# Patient Record
Sex: MALE | Race: WHITE
[De-identification: names, ages, dates, MRNs, and addresses within clinical notes are randomized per-mention and may not be internally consistent; named-entity substitution may affect disease eponyms.]

---

## 2018-12-26 LAB
ANION GAP SERPL CALC-SCNC: 14.5 MMOL/L (ref 8–16)
BASOPHILS # BLD AUTO: 0.1 10*3/UL (ref 0–0.1)
BASOPHILS NFR BLD AUTO: 1.4 % (ref 0–1)
BUN SERPL-MCNC: 15 MG/DL (ref 7–26)
BUN/CREAT SERPL: 15 (ref 6–25)
CALCIUM SERPL-MCNC: 9.2 MG/DL (ref 8.4–10.2)
CHLORIDE SERPL-SCNC: 103 MMOL/L (ref 98–107)
CO2 SERPL-SCNC: 27 MMOL/L (ref 22–29)
DEPRECATED NEUTROPHILS # BLD AUTO: 3.5 10*3/UL (ref 2.1–6.9)
EGFRCR SERPLBLD CKD-EPI 2021: > 60 ML/MIN (ref 60–?)
EOSINOPHIL # BLD AUTO: 0 10*3/UL (ref 0–0.4)
EOSINOPHIL NFR BLD AUTO: 0 % (ref 0–6)
ERYTHROCYTE [DISTWIDTH] IN CORD BLOOD: 12.4 % (ref 11.7–14.4)
GLUCOSE SERPLBLD-MCNC: 100 MG/DL (ref 74–118)
HCT VFR BLD AUTO: 44.4 % (ref 38.2–49.6)
HGB BLD-MCNC: 15.4 G/DL (ref 14–18)
LYMPHOCYTES # BLD: 1.5 10*3/UL (ref 1–3.2)
LYMPHOCYTES NFR BLD AUTO: 26.2 % (ref 18–39.1)
MCH RBC QN AUTO: 31 PG (ref 28–32)
MCHC RBC AUTO-ENTMCNC: 34.7 G/DL (ref 31–35)
MCV RBC AUTO: 89.5 FL (ref 81–99)
MONOCYTES # BLD AUTO: 0.6 10*3/UL (ref 0.2–0.8)
MONOCYTES NFR BLD AUTO: 11.1 % (ref 4.4–11.3)
NEUTS SEG NFR BLD AUTO: 60.9 % (ref 38.7–80)
PLATELET # BLD AUTO: 175 X10E3/UL (ref 140–360)
POTASSIUM SERPL-SCNC: 4.5 MMOL/L (ref 3.5–5.1)
RBC # BLD AUTO: 4.96 X10E6/UL (ref 4.3–5.7)
SODIUM SERPL-SCNC: 140 MMOL/L (ref 136–145)

## 2018-12-26 NOTE — DIAGNOSTIC IMAGING REPORT
EXAM: CHEST 2 VIEWS, PA and lateral

DATE: 12/26/2018 9:10 AM  Time stamp on exam: 9:24 AM

INDICATION: Preoperative

COMPARISON: None



FINDINGS:

LINES/TUBES: None



LUNGS: No consolidations or edema. 



PLEURA: No effusions or pneumothorax.



HEART AND MEDIASTINUM: Normal size and contour.



BONES AND SOFT TISSUES: No acute findings. Cervical spine fixation plate

partially visualized.



IMPRESSION:

No acute thoracic abnormality.











Signed by: Dr. Doe Whitlock DO on 12/26/2018 9:54 AM

## 2018-12-28 ENCOUNTER — HOSPITAL ENCOUNTER (OUTPATIENT)
Dept: HOSPITAL 88 - OR | Age: 58
Discharge: HOME | End: 2018-12-28
Attending: PODIATRIST
Payer: COMMERCIAL

## 2018-12-28 VITALS — SYSTOLIC BLOOD PRESSURE: 118 MMHG | DIASTOLIC BLOOD PRESSURE: 79 MMHG

## 2018-12-28 DIAGNOSIS — D21.21: ICD-10-CM

## 2018-12-28 DIAGNOSIS — G57.61: Primary | ICD-10-CM

## 2018-12-28 DIAGNOSIS — I44.0: ICD-10-CM

## 2018-12-28 DIAGNOSIS — Z01.810: ICD-10-CM

## 2018-12-28 DIAGNOSIS — Z88.2: ICD-10-CM

## 2018-12-28 DIAGNOSIS — Z01.812: ICD-10-CM

## 2018-12-28 DIAGNOSIS — Z01.818: ICD-10-CM

## 2018-12-28 PROCEDURE — 88304 TISSUE EXAM BY PATHOLOGIST: CPT

## 2018-12-28 PROCEDURE — 71046 X-RAY EXAM CHEST 2 VIEWS: CPT

## 2018-12-28 PROCEDURE — 93005 ELECTROCARDIOGRAM TRACING: CPT

## 2018-12-28 PROCEDURE — 80048 BASIC METABOLIC PNL TOTAL CA: CPT

## 2018-12-28 PROCEDURE — 28080 REMOVAL OF FOOT LESION: CPT

## 2018-12-28 PROCEDURE — 28043 EXC FOOT/TOE TUM SC < 1.5 CM: CPT

## 2018-12-28 PROCEDURE — 36415 COLL VENOUS BLD VENIPUNCTURE: CPT

## 2018-12-28 PROCEDURE — 85025 COMPLETE CBC W/AUTO DIFF WBC: CPT

## 2018-12-28 NOTE — XMS REPORT
Summary of Care: 9/25/15 - 9/25/15

                             Created on: 2108



EH NOBLES

External Reference #: [id number]

: 1960

Sex: Male



Demographics







                          Address                   [address] [place]  [address]-

 

                          Home Phone                [phone]

 

                          Preferred Language        English

 

                          Marital Status            

 

                          Catholic Affiliation     None

 

                          Race                      White/

 

                          Ethnic Group              Non-





Author







                          Author                    [facility] [place]

 

                          Organization              [facility] [place]

 

                          Address                   Unknown

 

                          Phone                     Unavailable







Encounter





HQ Encntr_alias(FIN) [id number] Date(s): 9/25/15 - 9/25/15

[facility] [place] [address] [place] [address] 
[phone]

Discharge Disposition: Home

Attending Physician: Jarad Wells MD





Vital Signs





No data available for this section



Problem List







    



              Condition     Effective Dates     Status       Health Status     Informant

 

    



                           Chest                     Active  



                                         pain(Confirmed)    

 

    



                           Lyme                      Resolved  



                                         disease(Confirmed)    

 

    



                           Syncope(Confirmed)        Active  







Allergies, Adverse Reactions, Alerts







   



                 Substance       Reaction        Severity        Status

 

   



                           morphine                  Active

 

   



                           NKFA                      Active

 

   



                           sulfa drugs               Active







Medications





No data available for this section



Results





No data available for this section



Immunizations





No data available for this section



Procedures







   



                 Procedure       Date            Related Diagnosis     Body Site

 

   



                           Procedure1                  

 

   



                           Repair of meniscus2         







1Neck Surgery C5 C7 fused



2left knee



Social History







 



                           Social History Type       Response

 

 



                           Exercise                  1

 

 



                           Alcohol                   Never, Previous treatment: None.

 

 



                           Smoking Status            Never smoker; Exposure to Tobacco Smoke None; Cigarette Smoking

 Last 365



                                         Days No; Reg Smoking Cessation Counseling No







1none



Assessment and Plan





No data available for this section

## 2018-12-28 NOTE — XMS REPORT
Summary of Care: 17 - 17

                             Created on: 2063



EH NOBLES

External Reference #: [id number]

: 1960

Sex: Male



Demographics







                          Address                   [address] [place]  [address]-

 

                          Home Phone                [phone]

 

                          Preferred Language        English

 

                          Marital Status            

 

                          Congregation Affiliation     None

 

                          Race                      White

 

                          Additional Race(s)        White/



 

                          Ethnic Group              Unknown





Author







                          Author                    [facility]

 

                          Organization              [facility]

 

                          Address                   Unknown

 

                          Phone                     Unavailable







Encounter





MUNDO Foreman(KIMBERLYN) [id number] Date(s): 17 - 17

[facility] [address] [place] [address]-     [phone]


Discharge Disposition: Home or Self Care

Attending Physician: Luzmaria Ryan MD

Admitting Physician: Luzmaria Ryan MD





Vital Signs







                    1                   2                   3



                                         Most recent to   



                                         oldest [Reference   



                                         Range]:   

 

                                        187.96 cm 

                          (17 10:15 PM)        187.96 cm 

                          (17 7:54 PM)          



                                         Height   

 

                                        97.9 DegF 

                          (17 12:38 PM)        97 DegF 

                          (17 7:02 AM)         97.4 DegF 

                                        (17 4:30 AM)



                                         Temperature Oral   



                                         [96.4-99.1 DegF]   

 

                                        117/68 mmHg 

                          (17 12:38 PM)        106/67 mmHg 

                          (17 7:02 AM)         112/69 mmHg 

                                        (17 4:30 AM)



                                         Blood Pressure   



                                         [/60-90 mmHg]   

 

                                        18 BRMIN 

                          (17 12:38 PM)        18 BRMIN 

                          (17 7:02 AM)         18 BRMIN 

                                        (17 4:30 AM)



                                         Respiratory Rate   



                                         [14-20 BRMIN]   

 

                                        53 bpm 

*LOW*

                          (17 12:38 PM)        52 bpm 

*LOW*

                          (17 7:02 AM)         52 bpm 

*LOW*

                                        (17 4:30 AM)



                                         Peripheral Pulse   



                                         Rate [ bpm]   

 

                                        81.818 kg 

                          (17 10:15 PM)        81.8 kg 

                          (17 7:54 PM)         81.818 kg 

                                        (17 3:25 PM)



                                         Weight   

 

                                        23.16 m2 

                          (17 10:15 PM)        23.15 m2 

                          (17 7:54 PM)          



                                         Body Mass Index   







Problem List







    



              Condition     Effective Dates     Status       Health Status     Informant

 

    



                           Chest                     Active  



                                         pain(Confirmed)    

 

    



                           Lyme                      Resolved  



                                         disease(Confirmed)    

 

    



                           Syncope(Confirmed)        Active  







Allergies, Adverse Reactions, Alerts







   



                 Substance       Reaction        Severity        Status

 

   



                           sulfa drugs               Active

 

   



                           morphine                  Active







Medications





acetaminophen

650 mg, 2 tab, Route: PO, Drug form: TAB, Q4H, Dosing Weight 81.818, kg, PRN Tamera
n Score 7-10, Start date: 17 17:52:00 CST, Duration: 30 day, Stop date:  17:51:00 CST

Notes: Do not exceed 4 gm/day.  (Same as: Tylenol)

Start Date: 17

Stop Date: 17

Status: Discontinued



acetaminophen-codeine #3

2 tab, Route: PO, Drug Form: TAB, Dosing Weight 81.818, kg, Q4H, PRN Pain Score 
7-10, Start date: 17 17:52:00 CST, Duration: 30 day, Stop date: 18 1
7:51:00 CST

Notes: Do not exceed 4gm/day of acetaminophen.  (Same as: Tylenol with Codeine #
3)

Start Date: 17

Stop Date: 17

Status: Discontinued



cyclobenzaprine

10 mg, 1 tab, Route: PO, Drug form: TAB, TID, Dosing Weight 81.818, kg, PRN Spas
m, Start date: 17 20:46:00 CST, Duration: 30 day, Stop date: 18 20:4
5:00 CST

Notes: (Same As: Flexeril)

Start Date: 17

Stop Date: 17

Status: Discontinued



cyclobenzaprine 10 mg oral tablet

10 mg=1 tab, PO, TID, PRN for spasms, # 30 tab, 0 Refill(s)

Start Date: 17

Stop Date: 17

Status: Ordered



Ibu 600 mg oral tablet

600 mg=1 tab, PO, QID, # 120 tab, 0 Refill(s)

Start Date: 17

Status: Ordered



ibuprofen

600 mg, 1 tab, Route: PO, Drug form: TAB, QID, Dosing Weight 81.818, kg, Start d
ate: 17 21:00:00 CST, Duration: 30 day, Stop date: 18 17:00:00 CST

Notes: (Same as: Motrin)"Do Not Crush"  Take with food.

Start Date: 17

Stop Date: 17

Status: Discontinued



Lactated Ringers (Bolus) IV

1,000 mL, 1,000 ml/hr, Infuse Over: 1 hr, Route: IV, 1,000, Drug form: INJ, ONCE
, Priority: STAT, Dosing Weight 81.818 kg, Start date: 17 15:53:00 CST, St
op date: 17 15:53:00 CST

Start Date: 17

Stop Date: 17

Status: Completed



nitroglycerin SL Tab

0.4 mg, 1 tab, Route: SL, Drug form: TAB, Q5Min, Dosing Weight 81.818, kg, Start
date: 17 15:55:00 CST, Duration: 3 doses or times, Stop date: 17 16
:05:00 CST

Notes: (Same as:Nitroquick, Nitrostat)"Do Not Crush"  Sublingual tablet

Start Date: 17

Stop Date: 17

Status: Completed



ondansetron

4 mg, 2 mL, Route: IVP, Drug form: INJ, Q8H, Dosing Weight 81.818, kg, PRN Nause
a & Vomiting, Start date: 17 17:52:00 CST, Duration: 30 day, Stop date: 
18 17:51:00 CST

Notes: (Same as: Zofran)  *** MEDICATION WASTE ***Product Size:  4 mgProduct Was
louie:  ___ mg

Start Date: 17

Stop Date: 17

Status: Discontinued



Saline Flush 0.9%

10 mL, Route: IVP, Drug Form: INJ, Dosing Weight 81.818, kg, PRN, PRN Line Flush
, Start date: 17 15:23:00 CST, Duration: 30 day, Stop date: 18 15:22
:00 CST

Notes: (Same as: BD Posiflush)

Start Date: 17

Stop Date: 17

Status: Discontinued



Sodium Chloride 0.9% IV 2,000 mL

2,000 mL, Rate: 100 ml/hr, Infuse over: 20 hr, Route: IV, Dosing Weight 81.818 k
g, Total Volume: 2,000, Start date: 17 17:52:00 CST, Duration: 10 hr, Stop
date: 17 3:51:00 CST, 2.05, m2

Start Date: 17

Stop Date: 17

Status: Completed



Visipaque 320 mg/mL injectable solution

100 mL, Route: IVP, Drug Form: SOLN, Dosing Weight 81.818, kg, ONCALL, For CTA e
xam with GFR < 31 mL/min, STAT, Start date: 17 20:38:00 CST, Duration: 1 
doses or times

Notes: (Same as: Visipaque).WASTE: F/P - Black; E - Municipal Trash Bin

Start Date: 17

Stop Date: 17

Status: Completed



Results





ELECTROLYTES





                    1                   2                   3



                                         Most recent to   



                                         oldest [Reference   



                                         Range]:   

 

                                        142 mEq/L 

                          (17 5:49 AM)         141 mEq/L 

                          (17 5:49 AM)         140 mEq/L 

                                        (17 3:37 PM) 



                                         Sodium Lvl [135-145   



                                         mEq/L]   

 

                                        3.9 mEq/L 

                          (17 5:49 AM)         4.0 mEq/L 

                          (17 5:49 AM)         3.8 mEq/L 

                                        (17 3:37 PM) 



                                         Potassium Lvl   



                                         [3.5-5.1 mEq/L]   

 

                                        109 mEq/L 

                          (17 5:49 AM)         108 mEq/L 

                          (17 5:49 AM)         103 mEq/L 

                                        (17 3:37 PM) 



                                         Chloride Lvl [   



                                         mEq/L]   

 

                                        26 mEq/L 

                          (17 5:49 AM)         26 mEq/L 

                          (17 5:49 AM)         27 mEq/L 

                                        (17 3:37 PM) 



                                         CO2 [24-32 mEq/L]   

 

                                        10.9 mEq/L 

                          (17 5:49 AM)         11.0 mEq/L 

                          (17 5:49 AM)         13.8 mEq/L 

                                        (17 3:37 PM) 



                                         AGAP [10.0-20.0   



                                         mEq/L]   







CHEM PANEL





                    1                   2                   3



                                         Most recent to   



                                         oldest [Reference   



                                         Range]:   

 

                                        0.80 mg/dL 

                          (17 5:49 AM)         0.80 mg/dL 

                          (17 5:49 AM)         1.20 mg/dL 

                                        (17 3:37 PM) 



                                         Creatinine Lvl   



                                         [0.50-1.40 mg/dL]   

 

                                        98 mL/min/1.73m2 1

*NA*

                          (17 5:49 AM)         98 mL/min/1.73m2 2

*NA*

                          (17 5:49 AM)         66 mL/min/1.73m2 3

*NA*

                                        (17 3:37 PM) 



                                         eGFR   

 

                                        11 mg/dL 

                          (17 5:49 AM)         11 mg/dL 

                          (17 5:49 AM)         10 mg/dL 

                                        (17 3:37 PM) 



                                         BUN [7-22 mg/dL]   

 

                                        14 

                          (17 5:49 AM)         8 

                          (17 3:37 PM)          



                                         B/C Ratio [6-25]   

 

                                        88 mg/dL 

                          (17 5:49 AM)         89 mg/dL 

                          (17 5:49 AM)         91 mg/dL 

                                        (17 3:37 PM) 



                                         Glucose Lvl [70-99   



                                         mg/dL]   

 

                                        5.9 g/dL 

*LOW*

                          (17 5:49 AM)         6.0 g/dL 

*LOW*

                          (17 5:49 AM)         7.5 g/dL 

                                        (17 3:37 PM) 



                                         Total Protein   



                                         [6.4-8.4 g/dL]   

 

                                        3.2 g/dL 

*LOW*

                          (17 5:49 AM)         3.2 g/dL 

*LOW*

                          (17 5:49 AM)         4.4 g/dL 

                                        (17 3:37 PM) 



                                         Albumin Lvl [3.5-5.0   



                                         g/dL]   

 

                                        2.7 g/dL 

                          (17 5:49 AM)         2.8 g/dL 

                          (17 5:49 AM)         3.1 g/dL 

                                        (17 3:37 PM) 



                                         Globulin [2.7-4.2   



                                         g/dL]   

 

                                        1.2 

                          (17 5:49 AM)         1.1 

                          (17 5:49 AM)         1.4 

                                        (17 3:37 PM) 



                                         A/G Ratio [0.7-1.6]   

 

                                        7.9 mg/dL 

*LOW*

                          (17 5:49 AM)         7.9 mg/dL 

*LOW*

                          (17 5:49 AM)         8.7 mg/dL 

                                        (17 3:37 PM) 



                                         Calcium Lvl   



                                         [8.5-10.5 mg/dL]   

 

                                        70 unit/L 

*HI*

                          (17 5:49 AM)         70 unit/L 

*HI*

                          (17 5:49 AM)         98 unit/L 

*HI*

                                        (17 3:37 PM) 



                                         ALT [0-65 unit/L]   

 

                                        37 unit/L 

                          (17 5:49 AM)         34 unit/L 

                          (17 5:49 AM)         55 unit/L 

*HI*

                                        (17 3:37 PM) 



                                         AST [0-37 unit/L]   

 

                                        65 unit/L 

                          (17 5:49 AM)         66 unit/L 

                          (17 5:49 AM)         92 unit/L 

                                        (17 3:37 PM) 



                                         Alk Phos [   



                                         unit/L]   

 

                                        2.1 mg/dL 

*HI*

                          (17 5:49 AM)         2.1 mg/dL 

*HI*

                          (17 5:49 AM)         1.9 mg/dL 

*HI*

                                        (17 3:37 PM) 



                                         Bili Total [0.2-1.3   



                                         mg/dL]   

 

                                        0.3 mg/dL 

                    (17 5:49 AM)                          



                                         Bili Direct [0.0-0.3   



                                         mg/dL]   

 

                                        1.8 mg/dL 

*HI*

                    (17 5:49 AM)                          



                                         Bili Indirect   



                                         [0.0-1.0 mg/dL]   

 

                                        <0.05 ng/mL 

                    (17 5:49 AM)                          



                                         Procalcitonin Lvl   



                                         [0.00-0.10 ng/mL]   







1Result Comment: The eGFR is calculated using the CKD-EPI formula. In most 
young, healthy individuals the eGFR will be >90 mL/min/1.73m2. The eGFR declines
with age. An eGFR of 60-89 may be normal in some populations, particularly the 
elderly, for whom the CKD-EPI formula has not been extensively validated. Use of
the eGFR is not recommended in the following populations:



Individuals with unstable creatinine concentrations, including pregnant patients
and those with serious co-morbid conditions.



Patients with extremes in muscle mass or diet. 



The data above are obtained from the National Kidney Disease Education Program (
NKDEP) which additionally recommends that when the eGFR is used in patients with
extremes of body mass index for purposes of drug dosing, the eGFR should be mul
tiplied by the estimated BMI.



2Result Comment: The eGFR is calculated using the CKD-EPI formula. In most 
young, healthy individuals the eGFR will be >90 mL/min/1.73m2. The eGFR declines
with age. An eGFR of 60-89 may be normal in some populations, particularly the 
elderly, for whom the CKD-EPI formula has not been extensively validated. Use of
the eGFR is not recommended in the following populations:



Individuals with unstable creatinine concentrations, including pregnant patients
and those with serious co-morbid conditions.



Patients with extremes in muscle mass or diet. 



The data above are obtained from the National Kid[employer] Disease Education Program (
NKDEP) which additionally recommends that when the eGFR is used in patients with
extremes of body mass index for purposes of drug dosing, the eGFR should be mul
tiplied by the estimated BMI.



3Result Comment: The eGFR is calculated using the CKD-EPI formula. In most 
young, healthy individuals the eGFR will be >90 mL/min/1.73m2. The eGFR declines
with age. An eGFR of 60-89 may be normal in some populations, particularly the 
elderly, for whom the CKD-EPI formula has not been extensively validated. Use of
the eGFR is not recommended in the following populations:



Individuals with unstable creatinine concentrations, including pregnant patients
and those with serious co-morbid conditions.



Patients with extremes in muscle mass or diet. 



The data above are obtained from the National Kidney Disease Education Program (
NKDEP) which additionally recommends that when the eGFR is used in patients with
extremes of body mass index for purposes of drug dosing, the eGFR should be mul
tiplied by the estimated BMI.



CARDIAC ENZYMES





                    1                   2                   3



                                         Most recent to   



                                         oldest [Reference   



                                         Range]:   

 

                                        101 unit/L 

                    (17 3:37 PM)                          



                                         Total CK [   



                                         unit/L]   

 

                                        0.7 ng/mL 

                    (17 3:37 PM)                          



                                         CK MB [0.5-3.6   



                                         ng/mL]   

 

                                        0.7 

                    (17 3:37 PM)                          



                                         CK MB Index   



                                         [0.0-2.5]   

 

                                        <0.02 ng/mL 

                    (17 3:37 PM)                          



                                         Troponin-I   



                                         [0.00-0.40 ng/mL]   

 

                                        34 pg/mL 

                    (17 5:49 AM)                          



                                         BNP [<=100 pg/mL]   







IMMUNOLOGY





                    1                   2                   3



                                         Most recent to   



                                         oldest [Reference   



                                         Range]:   

 

                                        Negative 

*NA*

                    (17 11:47 PM)                          



                                         Hep Bs Ag [Negative]   

 

                                        Negative 

*NA*

                    (17 11:47 PM)                          



                                         Hep B Core IgM   



                                         [Negative]   

 

                                        Negative 

*NA*

                    (17 11:47 PM)                          



                                         Hep A IgM [Negative]   

 

                                        Negative 

*NA*

                    (17 11:47 PM)                          



                                         Hep C Ab   







HEMATOLOGY





                    1                   2                   3



                                         Most recent to   



                                         oldest [Reference   



                                         Range]:   

 

                                        5.3 K/CMM 

                          (17 5:49 AM)         7.3 K/CMM 

                          (17 3:37 PM)          



                                         WBC [3.7-10.4 K/CMM]   

 

                                        4.53 M/CMM 

*LOW*

                          (17 5:49 AM)         4.99 M/CMM 

                          (17 3:37 PM)          



                                         RBC [4.70-6.10   



                                         M/CMM]   

 

                                        13.6 g/dL 

*LOW*

                          (17 5:49 AM)         15.6 g/dL 

                          (17 3:37 PM)          



                                         Hgb [14.0-18.0 g/dL]   

 

                                        39.4 % 

*LOW*

                          (17 5:49 AM)         44.9 % 

                          (17 3:37 PM)          



                                         Hct [42.0-54.0 %]   

 

                                        87.0 fL 

                          (17 5:49 AM)         89.9 fL 

                          (17 3:37 PM)          



                                         MCV [80.0-94.0 fL]   

 

                                        30.0 pg 

                          (17 5:49 AM)         31.2 pg 

*HI*

                          (17 3:37 PM)          



                                         MCH [27.0-31.0 pg]   

 

                                        34.5 g/dL 

                          (17 5:49 AM)         34.8 g/dL 

                          (17 3:37 PM)          



                                         MCHC [32.0-36.0   



                                         g/dL]   

 

                                        13.2 % 

                          (17 5:49 AM)         13.0 % 

                          (17 3:37 PM)          



                                         RDW [11.5-14.5 %]   

 

                                        152 K/CMM 

                          (17 5:49 AM)         214 K/CMM 

                          (17 3:37 PM)          



                                         Platelet [133-450   



                                         K/CMM]   

 

                                        7.9 fL 

                          (17 5:49 AM)         7.6 fL 

                          (17 3:37 PM)          



                                         MPV [7.4-10.4 fL]   

 

                                        54.3 % 

                          (17 5:49 AM)         59.2 % 

                          (17 3:37 PM)          



                                         Segs [45.0-75.0 %]   

 

                                        30.2 % 

                          (17 5:49 AM)         26.1 % 

                          (17 3:37 PM)          



                                         Lymphocytes   



                                         [20.0-40.0 %]   

 

                                        10.2 % 

                          (17 5:49 AM)         11.6 % 

                          (17 3:37 PM)          



                                         Monocytes [2.0-12.0   



                                         %]   

 

                                        4.0 % 

                          (17 5:49 AM)         2.2 % 

                          (17 3:37 PM)          



                                         Eosinophils [0.0-4.0   



                                         %]   

 

                                        1.3 % 

*HI*

                          (17 5:49 AM)         0.9 % 

                          (17 3:37 PM)          



                                         Basophils [0.0-1.0   



                                         %]   

 

                                        2.9 K/CMM 

                          (17 5:49 AM)         4.3 K/CMM 

                          (17 3:37 PM)          



                                         Segs-Bands #   



                                         [1.5-8.1 K/CMM]   

 

                                        1.6 K/CMM 

                          (17 5:49 AM)         1.9 K/CMM 

                          (17 3:37 PM)          



                                         Lymphocytes #   



                                         [1.0-5.5 K/CMM]   

 

                                        0.5 K/CMM 

                          (17 5:49 AM)         0.8 K/CMM 

                          (17 3:37 PM)          



                                         Monocytes # [0.0-0.8   



                                         K/CMM]   

 

                                        0.2 K/CMM 

                          (17 5:49 AM)         0.2 K/CMM 

                          (17 3:37 PM)          



                                         Eosinophils #   



                                         [0.0-0.5 K/CMM]   

 

                                        0.1 K/CMM 

                          (17 5:49 AM)         0.1 K/CMM 

                          (17 3:37 PM)          



                                         Basophils # [0.0-0.2   



                                         K/CMM]   







Immunizations





No data available for this section



Procedures







   



                 Procedure       Date            Related Diagnosis     Body Site

 

   



                           Procedure1                  

 

   



                           Repair of meniscus2         

 

   



                                         Cervical spinal fusion   







1Neck Surgery C5 C7 fused



2left knee



Social History







 



                           Social History Type       Response

 

 



                           Exercise                  1

 

 



                           Alcohol                   Never, Previous treatment: None.

 

 



                           Smoking Status            Never smoker; Exposure to Tobacco Smoke None; Cigarette Smoking

 Last 365



                                         Days No; Reg Smoking Cessation Counseling No







1none



Assessment and Plan

Extracted from:





  



                     Title: Discharge Summary     Author: Luzmaria Ryan     Date: 17



                                         MD 









                                         Impression and Plan

Atypical chest pain not due to coronary artery disease

History of Lyme's disease since 

Shortness of breath which is most likely noncardiac related.



FOLLOWUP:

The patient will follow up with his PCP.



DISCHARGE MEDICATIONS:

See  medication reconciliation sheet for details of discharge medications.



DISCHARGE DIET:

Cardiac diet.





Extracted from:





  



                     Title: Cardiology     Author: Jackie Tinoco NP     Date: 17











Patient:   EH NOBLES            MRN: [id number]            FIN: 
[id number]

Age:   57 years     Sex:  Male     :  1960

Associated Diagnoses:   None

Author:   Jackie Tinoco NP



Chief Complaint



                                        2017 22:15

chest pain

                                        2017 20:01

Chest pains and shortness of breath

                                        2017 15:25

pt from PCP having severe chest pain for a "couple days" per Dr. Hutchinson pt may 
need heart cath





Subjective

Cardiology NP with Dr. Ott



Patient seen and examined

No acute changes or distress

Insertion site looks good

[facility]se called with reports of NS-Bradycardia, short lived, asymptomatic



Review of Systems

Constitutional:  Negative.

Respiratory:  Shortness of breath.

Cardiovascular:  Negative.

Gastrointestinal:  Negative.

Genitourinary:  Negative.

Musculoskeletal:  Negative.

Integumentary:  Negative.

Neurologic:  Negative.



Health Status

Allergies:

Allergic Reactions (All)

Severity Not Documented

Morphine- No reactions were documented.

Sulfa drugs- No reactions were documented.

Canceled/Inactive Reactions (All)

Severity Not Documented

NKFA- No reactions were documented.,

Allergies (2) ActiveReaction

morphineNone Documented

sulfa drugsNone Documented



Current medications:

No qualifying data available

,

Medications (6) Active

Scheduled: (1)

ibuprofen 600 mg TAB  600 mg 1 tab, PO, QID

Continuous: (0)

PRN: (5)

acetaminophen 325 mg TABLET  650 mg 2 tab, PO, Q4H

acetaminophen-codeine 300-30 mg TAB  2 tab, PO, Q4H

cyclobenzaprine 10 mg TAB  10 mg 1 tab, PO, TID

ondansetron 4 mg/2ml INJ VL  4 mg 2 mL, IVP, Q8H

sodium chloride 0.9% 10 ml flush syr BD  10 mL, IVP, PRN



Problem list:

All Problems

Chest pain / SNOMED CT 63186843 / Confirmed

Syncope / SNOMED CT 831678151 / Confirmed,

Active Problems (2)

Chest pain 

Syncope 





Objective

VS/Measurements

Measurements from flowsheet : Measurements

                                        2017 22:15

Heparin Dosing Weight (kg)

                                        81.82

                                        2017 22:15

Height

                                        187.96 cm



Height Collection Method

Stated



Weight

                                        81.818 kg



Dosing Weight Difference Percent

                                        0.022 %



Dosing Weight Collection Method

Measured



Body Surface Area

                                        2.0668 m2



Body Mass Index

                                        23.16 m2

                                        2017 21:30

Heparin Dosing Weight (kg)

                                        81.80

                                        2017 19:54

Height

                                        187.96 cm



Height Collection Method

Stated



Weight

                                        81.8 kg



Dosing Weight Difference Percent

                                        -0.022 %



Dosing Weight Collection Method

Estimated



Body Surface Area

                                        2.0666 m2



Body Mass Index

                                        23.15 m2

                                        2017 15:28

Heparin Dosing Weight (kg)

                                        81.82

                                        2017 15:25

Weight

                                        81.818 kg



Dosing Weight Difference Percent

                                        0 %



Dosing Weight Collection Method

Measured



,



Vital Signs (last 24 hrs) 

Last Charted 

Temp Oral 

                                        97 DegF  (DEC 08 07:)

Heart Rate Peripheral 

L52 bpm  (DEC 08 07:)

Resp Rate 

                                        18 BRMIN  (DEC 08 07:)

SBP 

                                        106 mmHg  (DEC 08 07:)

DBP 

                                        67 mmHg  (DEC 08 07:)

SpO2 

                                        97 %  (DEC 08 00:04)

Weight 

                                        81.818 kg  (DEC 07 22:15)

Height 

                                        187.96 cm  (DEC 07 22:15)

BMI 

                                        23.16  (DEC 07 22:15)



Labs 

Most Recent Results 

Previous Results 

WBC 

                                        5.3  (DEC 08)

                                        7.3  (DEC 07)

                                        -- 

                                        -- 

Hgb 

L 13.6  (DEC 08)

                                        15.6  (DEC 07)

                                        -- 

                                        -- 

Hct 

L 39.4  (DEC 08)

                                        44.9  (DEC 07)

                                        -- 

                                        -- 

Plt 

                                        152  (DEC 08)

                                        214  (DEC 07)

                                        -- 

                                        -- 

Na 

                                        142  (DEC 08)

                                        141  (DEC 08)

                                        140  (DEC 07)

                                        -- 

K 

                                        4.0  (DEC 08)

                                        3.9  (DEC 08)

                                        3.8  (DEC 07)

                                        -- 

CO2 

                                        26  (DEC 08)

                                        26  (DEC 08)

                                        27  (DEC 07)

                                        -- 

Cl 

                                        108  (DEC 08)

                                        109  (DEC 08)

                                        103  (DEC 07)

                                        -- 

Cr 

                                        0.80  (DEC 08)

                                        0.80  (DEC 08)

                                        1.20  (DEC 07)

                                        -- 

BUN 

                                        11  (DEC 08)

                                        11  (DEC 08)

                                        10  (DEC 07)

                                        -- 

Glucose Random 

                                        88  (DEC 08)

                                        89  (DEC 08)

                                        91  (DEC 07)

                                        -- 

Ca 

L 7.9  (DEC 08)

L 7.9  (DEC 08)

                                        8.7  (DEC 07)

                                        -- 

Troponin 

<0.02  (DEC 07)

                                        -- 

CK MB 

                                        0.7  (DEC 07)

                                        -- 

Total CK 

                                        101  (DEC 07)

[facility], -- 



Impression and Plan

                                        1. Shortness of breath

                                        - CE neg

                                        - No acute ECG changes

                                        - No significant coronary disease by coronary angiogram, mild dialted Ao root

                                        - EF 50-55%

                                        - Elevated PA pressures by RHC

                                        - Pulmonary consulted

                                        - Hx of Lyme disease



                                        2. Bradycardia

                                        - Stable

                                        - Asymptomaitc

                                        - On no chronotropic inhibiting medicaitons

                                        - Monitor



                                        3. Pulmonary HTN

                                        - Pulmonary consulted



Discussed in rounds with Dr. Ott









 



                           Addendum                  As above. Pt needs a full pulmonary evaluation - likely outpt



                                         by 



                                         Maged Woods MD 



                                         on 



                                         2017 



                                         23:03 





Extracted from:





  



                     Title: General Admission H&P *     Author: Luzmaria Ryan     Date: 17





                                         MD 









                                         Impression and Plan

Atypical chest pain not due to coronary artery disease

History of Lyme's disease since 

Shortness of breath which is most likely noncardiac related.



Plan: Patient is admitted for further evaluation of this chest pain and 
shortness of breath.  Cardiologist has consulted infectious disease specialist 
for evaluation of shortness of breath.  Cardiologist has also consulted 
infectious disease specialist to evaluate patient for Lyme disease.  I will get 
BNP and also get blood gases.  I am waiting for pulmonology and infectious 
disease consult.  Patient is currently under observation status.

## 2018-12-28 NOTE — XMS REPORT
Summary of Care: 14 - 14

                             Created on: 2076



EH NOBLES

External Reference #: [id number]

: 1960

Sex: Male



Demographics







                          Address                   [address] [place]  [address]

 

                          Home Phone                [phone]

 

                          Preferred Language        English

 

                          Marital Status            

 

                          [employer] Affiliation     None

 

                          Race                      White/

 

                          Ethnic Group              Non-





Author







                          Organization              Unknown

 

                          Address                   Unknown

 

                          Phone                     Unavailable







Encounter





MUNDO Foreman(KIMBERLYN) [id number] Date(s): 14 - 14

[address]

Discharge Disposition: Home

Physician Attending: Kong Ewing MD

Physician_Referring: Kong Ewing MD





Reason for Visit





SYNCOPE



Vital Signs







 



                           Most recent to            1



                                         oldest [Reference 



                                         Range]: 

 

 



                           Height                    187.96 cm



                                         (14 1:45 PM)

 

 



                           Weight                    85 kg



                                         (14 1:45 PM)

 

 



                           Body Mass Index           24.06 m2



                                         (14 1:45 PM)







Problem List







    



              Condition     Effective Dates     Status       Health Status     Informant

 

    



                           Chest                     Active  



                                         pain(Confirmed)    

 

    



                           Lyme                      Resolved  



                                         disease(Confirmed)    

 

    



                           Syncope(Confirmed)        Active  







Allergies, Adverse Reactions, Alerts







   



                 Substance       Reaction        Severity        Status

 

   



                           morphine                  Active

 

   



                           NKFA                      Active

 

   



                           sulfa drugs               Active







Medications





No data available for this section



Results





ELECTROLYTES





 



                           Most recent to            1



                                         oldest [Reference 



                                         Range]: 

 

 



                           POC Sodium [135-145       140 mEq/L



                           mEq/L]                    (14 1:41 PM)

 

 



                           POC Potassium             3.4 mEq/L



                           [3.5-5.1 mEq/L]           *LOW*



                                         (14 1:41 PM)

 

 



                           POC Chloride [      104 mEq/L



                           mEq/L]                    (14 1:41 PM)

 

 



                           POC Carbon Dioxide        24 mEq/dL



                           [24-32 mEq/dL]            (14 1:41 PM)

 

 



                           POC AGAP [10.0-20.0       17.0 mEq/L



                           mEq/L]                    (14 1:41 PM)







CHEM PANEL





 



                           Most recent to            1



                                         oldest [Reference 



                                         Range]: 

 

 



                           eGFR                      84 mL/min/1.73m2 1



                                         *NA*



                                         (14 1:41 PM)

 

 



                           POC Creatinine            1.0 mg/dL



                           [0.5-1.4 mg/dL]           (14 1:41 PM)

 

 



                           POC BUN [7-22 mg/dL]      10 mg/dL



                                         (14 1:41 PM)

 

 



                           POC Glucose [70-99        100 mg/dL



                           mg/dL]                    *HI*



                                         (14 1:41 PM)

 

 



                           POC Ion Ca                1.18 mMol/L



                           [1.05-1.25 mMol/L]        (14 1:41 PM)







1Result Comment: The eGFR is calculated using the CKD-EPI formula. In most 
young, healthy individuals the eGFR will be >90 mL/min/1.73m2. The eGFR declines
with age. An eGFR of 60-89 may be normal in some populations, particularly the 
elderly, for whom the CKD-EPI formula has not been extensively validated. Use of
the eGFR is not recommended in the following populations:



Individuals with unstable creatinine concentrations, including pregnant patients
and those with serious co-morbid conditions.



Patients with extremes in muscle mass or diet. 



The data above are obtained from the National Kidney Disease Education Program (
NKDEP) which additionally recommends that when the eGFR is used in patients with
extremes of body mass index for purposes of drug dosing, the eGFR should be mul
tiplied by the estimated BMI.



HEMATOLOGY





 



                           Most recent to            1



                                         oldest [Reference 



                                         Range]: 

 

 



                           POC Hemoglobin            12.2 g/dL



                           [14.0-18.0 g/dL]          *LOW*



                                         (14 1:41 PM)

 

 



                           POC Hematocrit            36.0 %



                           [42.0-54.0 %]             *LOW*



                                         (14 1:41 PM)







Medications Administered During Your Visit





No data available for this section



Immunizations





No data available for this section



Procedures







   



                 Procedure Type     Body Site       Date of Procedure     Related Diagnosis

 

   



                           Procedure1                 

 

   



                           Repair of meniscus2        







1Neck Surgery C5 C7 fused



2left knee



Social History







 



                           Social History Type       Response

 

 



                           Exercise                  1

 

 



                           Alcohol                   Use: Never, Previous treatment: None

 

 



                           Smoking Status            Never smoker, Exposure to Tobacco Smoke None, Cigarette Smoking

 Last 365



                                         Days No, Reg Smoking Cessation Counseling No







1none

## 2018-12-28 NOTE — OPERATIVE REPORT
DATE OF PROCEDURE:  December 28, 2018 

 

PREOPERATIVE DIAGNOSIS:  Plantar lipoma/fibroma of the right foot with 

neuroma, 3rd intermetatarsal space, right foot.  



POSTOPERATIVE DIAGNOSIS:   Plantar lipoma/fibroma of the right foot with 

neuroma, 3rd intermetatarsal space, right foot.  



OPERATION:  Excision of the soft tissue mass lipoma/fibroma, plantar aspect 

of the right foot and neurolysis of the 3rd intermetatarsal space nerve on 

the right foot.



ANESTHESIA:  General endotracheal.



HEMOSTASIS:  Right thigh tourniquet at 350 mmHg.



PROCEDURE IN DETAIL:  The patient was taken to the operating room in a 

mildly sedated state, and placed upon the operating table in the supine 

position.  Following induction of general anesthetic, the right lower 

extremity was elevated to 60 degrees to exsanguinate before inflating the 

pneumatic thigh tourniquet to 350 mmHg with good hemostasis.  The right 

lower extremity was placed upon the operating table prior to performing the 

following procedure.



PROCEDURE #1:  Lipoma/fibroma, plantar aspect of the right foot.  There is 

a purplish discoloration in the central aspect of the plantar forefoot 

approximately between the 3rd and 4th metatarsals and proximally near the 

midshaft area.  The purplish discoloration was used as the center point of 

the incision, which was placed overlying that central hemangioma.  The 

incision was deepened via sharp and blunt dissection down to the level of 

the plantar fibroma/lipoma.  This area was dissected free from all 

surrounding and constricting tissues.  All superficial and deep bleeders 

were electrocoagulated.  There were multiple areas of varicosities and 

tortuous vessels in that area.  After removal of the mass, further 

dissection revealed impingement and compression on the plantar nerve in 

that area.  Utilizing an operating microscope, the 3rd intermetatarsal 

space nerve was identified and dissected from surrounding and constricting 

materials.  The appropriate neurolysis having thus been performed.  The 

area was irrigated with copious amounts of sterile saline solution.  A 

small neuroma mass was noted within the 3rd intermetatarsal space and was 

removed as well.  After irrigation, a TLS drain was installed from plantar 

to dorsal.  Deep closure was achieved with 3-0 Vicryl, subcutaneous closure 

with 4-0 Vicryl and skin closure with 4-0 nylon.  The areas of surgery were 

blocked with 0.5 Marcaine and Decadron LA.  Release of the pneumatic thigh 

tourniquet showed normal hyperemic flush to all digits to the right foot.  

The patient left the operating room with vital signs stable and in apparent 

satisfactory condition having tolerated both anesthetic and procedure very 

well.  









DD:  12/28/2018 14:45

DT:  12/28/2018 15:37

Job#:  [id number]

## 2018-12-28 NOTE — XMS REPORT
Summary of Care: 14 - 14

                             Created on: 05/15/2069



EH NOBLES

External Reference #: [id number]

: 1960

Sex: Male



Demographics







                          Address                   [address]  [address]-

 

                          Home Phone                [phone]

 

                          Preferred Language        English

 

                          Marital Status            

 

                          Pentecostalism Affiliation     None

 

                          Race                      White/

 

                          Ethnic Group              Non-





Author







                          Organization              Unknown

 

                          Address                   Unknown

 

                          Phone                     Unavailable







Encounter





HQ Nicolasr_gucci(FIN) [id number] Date(s): 14 - 14

[facility] [place] [id number] [place] [address] [phone]

Discharge Disposition: Home

Physician Attending: Jarad Wells MD





Reason for Visit





339.42 - NEW DAILY PERS



Problem List







    



              Condition     Effective Dates     Status       Health Status     Informant

 

    



                           Chest                     Active  



                                         pain(Confirmed)    

 

    



                           Lyme                      Resolved  



                                         disease(Confirmed)    

 

    



                           Syncope(Confirmed)        Active  







Allergies, Adverse Reactions, Alerts







   



                 Substance       Reaction        Severity        Status

 

   



                           morphine                  Active

 

   



                           NKFA                      Active

 

   



                           sulfa drugs               Active







Medications





No data available for this section



Medications Administered During Your Visit





No data available for this section



Immunizations





No data available for this section



Social History







 



                           Social History Type       Response

 

 



                           Exercise                  1

 

 



                           Alcohol                   Use: Never, Previous treatment: None

 

 



                           Smoking Status            Never smoker, Exposure to Tobacco Smoke None, Cigarette Smoking

 Last 365



                                         Days No, Reg Smoking Cessation Counseling No







1none

## 2018-12-28 NOTE — XMS REPORT
Encounter CCD: 10/22/2012 to 10/22/2012

                             Created on: 2077



EH NOBLES

External Reference #: [id number]

: 1960

Sex: Male



Demographics







                          Address                   [address] [place] [address] [place]  [address]-

 

                          Home Phone                [phone]

 

                          Preferred Language        Unknown

 

                          Marital Status            

 

                          Hoahaoism Affiliation     Unknown

 

                          Race                      White/

 

                          Ethnic Group              Non-





Author







                          Author                    [employer] Generated

 

                          Organization              [facility]

 

                          Address                   Unknown

 

                          Phone                     Unavailable







Care Team Providers







                    Care Team Member Name    Role                Phone

 

                    Rainer No Jr    CP                  [phone]







Allergies, Adverse Reactions, Alerts







  



                     Substance           Reaction            Status

 

  



                           morphine                  Active

 

  



                           NKFA                      Active

 

  



                           sulfa drugs               Active







Problem List







  



                     Condition           Effective Dates     Status

 

  



                           Chest pain                Active

## 2018-12-28 NOTE — XMS REPORT
Patient Summary Document

                             Created on: 2018



EH NOBLES

External Reference #: [id number]

: 1960

Sex: Male



Demographics







                          Address                   [address] [place] [address]

 

                          Home Phone                [phone]

 

                          Preferred Language        Unknown

 

                          Marital Status            Unknown

 

                          Sikhism Affiliation     Unknown

 

                          Race                      Unknown

 

                                        Additional Race(s)  

 

                          Ethnic Group              Unknown





Author







                          Author                    [facility]

 

                          Address                   Unknown

 

                          Phone                     Unavailable







Care Team Providers







                    Care Team Member Name    Role                Phone

 

                    LISANDRA MUSE    Unavailable         Unavailable







Problems

This patient has no known problems.



Allergies, Adverse Reactions, Alerts

This patient has no known allergies or adverse reactions.



Medications

This patient has no known medications.



Results







           Test Description    Test Time    Test Comments    Text Results    Atomic Results    Result

 Comments

 

                CHEST 2 VIEWS    2018 09:52:00                                                             

                                             [place] [address]  
   Patient Name: EH NOBLES                                   MR #: 
[id number]                     : 1960                                  
Age/Sex: 58/M  Acct #: [id number]                              Req #: [id number]
[id number]  Adm Physician:                                                      
Ordered by: LISANDRA MUSE DPM                            Report #: [id number]  
     Location: [place]                                      Room/Bed:                
    
___________________________________________________________________________________________________
   Procedure: [id number] DX/CHEST 2 VIEWS  Exam Date:                            
Exam Time:                                               REPORT STATUS: Signed  
 EXAM: CHEST 2 VIEWS, PA and lateral   DATE: 2018 9:10 AM  Time stamp on 
exam: 9:24 AM   INDICATION: Preoperative   COMPARISON: None      FINDINGS:   
LINES/TUBES: None      LUNGS: No consolidations or edema.       PLEURA: No 
effusions or pneumothorax.      HEART AND MEDIASTINUM: Normal size and contour. 
    BONES AND SOFT TISSUES: No acute findings. Cervical spine fixation plate   
partially visualized.      IMPRESSION:   No acute thoracic abnormality.         
        Signed by: Dr. Allen Whitlock DO on 2018 9:54 AM        Dictated 
By: ALLEN WHITLOCK DO  Electronically Signed By: ALLEN WHITLOCK DO on 18  Transcribed By: [place] on 18       COPY TO:   LISANDRA MUSE DPM

## 2018-12-28 NOTE — XMS REPORT
Clinical Summary

                             Created on: 2018



Bar Morris

External Reference #: [id number]

: 1960

Sex: Male



Demographics







                          Address                   [address]

[place]  [address]

 

                          Home Phone                [phone]

 

                          Preferred Language        English

 

                          Marital Status            

 

                          Latter day Affiliation     Unknown

 

                          Race                      White

 

                          Ethnic Group              Non-





Author







                          Author                    [employer]

 

                          Organization              [facility]

 

                          Address                   Unknown

 

                          Phone                     Unavailable







Support







                Name            Relationship    Address         Phone

 

                Elisha Morris    [employer]            Unknown         [phone]







Care Team Providers







                    Care Team Member Name    Role                Phone

 

                    Marisol Villatoro MD    PCP                 [phone]







Allergies







                                        Comments



                 Active Allergy     Reactions       Severity        Noted Date 

 

                                        



Not allergic but gets severe headache



                     Morphine            Other (See          2017 



                                         Comments)   

 

                                         



                 Sulfa (Sulfonamide     Rash            Low             2017 



                                         Antibiotics)    







Medications

No known medications



Active Problems







 



                           Problem                   Noted Date

 

 



                           Acute chest pain          2017

 

 



                                         Pulmonary arterial hypertension 







Family History







   



                 Medical History     Relation        Name            Comments

 

   



                           Breast cancer             Mother  

 

   



                           Mitral valve prolapse     Mother  









   



                 Relation        Name            Status          Comments

 

   



                                         Mother   







Social History







                                        Date



                 Tobacco Use     Types           Packs/Day       Years Used 

 

                                         



                                         Never Smoker    

 

    



                                         Smokeless Tobacco: Never   



                                         Used   









   



                 Alcohol Use     Drinks/Week     oz/Week         Comments

 

   



                                         No   









 



                           Sex Assigned at Birth     Date Recorded

 

 



                                         Not on file 









                                        Industry



                           Job Start Date            Occupation 

 

                                        Not on file



                           Not on file               Not on file 









                                        Travel End



                           Travel History            Travel Start 

 





                                         No recent travel history available.







Last Filed Vital Signs

Not on file



Plan of Treatment







   



                 Health Maintenance     Due Date        Last Done       Comments

 

   



                           COLON CANCER SCREENING     2008  

 

   



                           SHINGLES VACCINES (1 of     2008  



                                         2)   

 

   



                           INFLUENZA VACCINE         2018  







Results

Not on fileafter 2017



Insurance







     



            Payer      Benefit     Subscriber ID     Type       Phone      Address



                                         Plan /    



                                         Group    

 

     



                 AETNA           [employer]           xxxxxxxxxx      HMO  



                                         HMO,POS,EP    



                                         O, [employer]    









     



            Guarantor Name     Account     Relation to     Date of     Phone      Billing Address



                     Type                Patient             Birth  

 

     



            Bar Morris     Personal/F     Self       1960     [phone]     [address]               (Home)              [place] [address]







Advance Directives





Patient has advance care planning documents, and code status on file. For more i
nformation, please contact:



Mark Cope



[address] [place] [id number]









                          Date Inactivated          Comments



                           Code Status               Date Activated  

 

                          3/8/2017 12:41 PM          



                           Full Code                 3/6/2017  4:35 PM  









  



                           Code Status decision reached by:     Patient

## 2018-12-28 NOTE — XMS REPORT
Continuity of Care Document

                             Created on: 2017



EH NOBLES

External Reference #: [id number]

: 1960

Sex: Male



Demographics







                          Address                   [address] [place]  [address]

 

                          Home Phone                [phone]

 

                          Preferred Language        Unknown

 

                          Marital Status            Unknown

 

                          Protestant Affiliation     Unknown

 

                          Race                      Unknown

 

                          Ethnic Group              Unknown





Author







                          Author                    [facility]              Interface

 

                          Address                   Unknown

 

                          Phone                     Unavailable



                                                    



Problems

                    





                    Problem                            Status                            Onset Date     

                          Classification                            Date Reported       

                          Comments                            Source                    

 

                    CHEST PAIN - 2 DAYS                            Active                            2017

                                                                                       

                                        [facility]                    

 

                          Discharge Diagnosis: Acute bilateral back pain                                    

                    2017

                                                        [facility]                 

   

 

                    REF BY PCP                            Active                            2017  

                                                                                       

                                        [facility]                    

 

                          R06.00 - "DYSPNEA, UNSPECIFIED" R06.02 -                            Active        

                    10/08/2015                                                         

                                                        [facility]              

      

 

                          611.72 - LUMP OR MASS IN                            Active                        

                    2015                                                                         

                                                       [facility]                    

 

                    OTHER                            Active                            2015       

                                                                                       

                                        [facility]                    

 

                          Discharge Diagnosis: Pneumonia                                                    

                    2015       

                                                      [facility]                    

 

                    SYNCOPE                            Active                            10/21/2014     

                                                                                       

                                        [facility]                    

 

                    338 - PAIN NEC                            Active                            10/19/2012

                                                                                       

                                         [facility]                    

 

                    Chest pain                            Active                                        

                          Problem                            10/24/2012                        

                                                       [facility]                    

 

                    Chest pain                            Active                                        

                          Problem                            2017                        

                                                       [facility], [facility] [place]     

               

 

                    Lyme disease                            Resolved                                    

                          Problem                            2017                    

                                                       [facility] [place] 

                   

 

                    Syncope                            Active                                           

                    Problem                            2017                             

                                         [facility] [place]        

            



                                                                                
                                                                                
                                                                                
                                     



Medications

                    





                    Medication                            Details                            Route      

                          Status                            Patient Instructions         

                          Ordering Provider                            Order Date           

                                        Source                    

 

                          Ibuprofen                            600 mg, 1 tab, Route: PO, Drug form: TAB, QID,

 Dosing Weight 81.818, kg, Start date: 17 21:00:00 CST, Duration: 30 day, 
Stop date: 18 17:00:00 CSTNotes: (Same as: Motrin) "Do Not Crush"  Take 
with food.                                                        No Longer Active   

                                                                                 2017

                                        [facility]                    

 

                          cyclobenzaprine                            10 mg, 1 tab, Route: PO, Drug form: TAB,

 TID, Dosing Weight 81.818, kg, PRN Spasm, Start date: 17 20:46:00 CST, 
Duration: 30 day, Stop date: 18 20:45:00 CSTNotes: (Same As: Flexeril)    
                                                      No Longer Active                  

                                                                            2017           

                                        [facility]                    

 

                          cyclobenzaprine 10 mg oral tablet                            10 mg=1 tab, PO, TID,

 PRN for spasms, # 30 tab, 0 Refill(s)                                             

                    Active                                                                      

                          2017                            [facility]                    

 

                          Ibuprofen 600 MG Oral Tablet [Ibu]                            600 mg=1 tab, PO, QID,

 # 120 tab, 0 Refill(s)                                                        Active

                                                                                    2017

                                        [facility]                    

 

                          Visipaque 320 mg/mL injectable solution                            100 mL, Route: 

IVP, Drug Form: SOLN, Dosing Weight 81.818, kg, ONCALL, For CTA exam with GFR 
Notes: (Same as: Visipaque).  WASTE: F/P - Black; E - Municipal Trash Bin       
                                                 Inactive                              

                                                      2017                     

                                        [facility]                    

 

                          Ondansetron                            4 mg, 2 mL, Route: IVP, Drug form: INJ, Q8H,

 Dosing Weight 81.818, kg, PRN Nausea & Vomiting, Start date: 17 17:52:00 
CST, Duration: 30 day, Stop date: 18 17:51:00 CSTNotes: (Same as: Zofran) 
  *** MEDICATION WASTE *** Product Size:  4 mg Product Wasted:  ___ mg          
                                                      No Longer Active                       

                                                                            2017                

                                        [facility]                    

 

                          Sodium Chloride 0.9% IV 2,000 mL                            2,000 mL, Rate: 100 ml/hr,

 Infuse over: 20 hr, Route: IV, Dosing Weight 81.818 kg, Total Volume: 2,000, 
Start date: 17 17:52:00 CST, Duration: 10 hr, Stop date: 17 3:51:00 
CST, 2.05, m2                                                        No Longer Active

                                                                                    2017

                                        [facility]                    

 

                          acetaminophen-codeine #3                            2 tab, Route: PO, Drug Form: TAB,

 Dosing Weight 81.818, kg, Q4H, PRN Pain Score 7-10, Start date: 17 
17:52:00 CST, Duration: 30 day, Stop date: 18 17:51:00 CSTNotes: Do not 
exceed 4gm/day of acetaminophen.  (Same as: Tylenol with Codeine # 3)           
                                                      No Longer Active                        

                                                                            2017                 

                                        [facility]                    

 

                          Acetaminophen                            650 mg, 2 tab, Route: PO, Drug form: TAB,

 Q4H, Dosing Weight 81.818, kg, PRN Pain Score 7-10, Start date: 17 
17:52:00 CST, Duration: 30 day, Stop date: 18 17:51:00 CSTNotes: Do not 
exceed 4 gm/day.  (Same as: Tylenol)                                               

                    No Longer Active                                                              

                          2017                            [facility]             

       

 

                          Nitroglycerin                            0.4 mg, 1 tab, Route: SL, Drug form: TAB,

 Q5Min, Dosing Weight 81.818, kg, Start date: 17 15:55:00 CST, Duration: 3
 doses or times, Stop date: 17 16:05:00 CSTNotes: (Same as:Nitroquick, N
itrostat) "Do Not Crush"  Sublingual tablet                                        

                    Inactive                                                               

                          2017                            [facility]              

      

 

                                        Calcium Chloride 0.0014 MEQ/ML / Potassium Chloride 0.004 MEQ/ML / Sodium Chloride

 0.103 MEQ/ML / Sodium Lactate 0.028 MEQ/ML Injectable Solution                 
                                        1,000 mL, 1,000 ml/hr, Infuse Over: 1 hr, Route: IV, 1,000, Drug form: INJ,

 ONCE, Priority: STAT, Dosing Weight 81.818 kg, Start date: 17 15:53:00 
CST, Stop date: 17 15:53:00 CST                                              

                    Inactive                                                                     

                          2017                            [facility]                    



 

                          Saline Flush 0.9%                            10 mL, Route: IVP, Drug Form: INJ, Dosing

 Weight 81.818, kg, PRN, PRN Line Flush, Start date: 17 15:23:00 CST, 
Duration: 30 day, Stop date: 18 15:22:00 CSTNotes: (Same as: BD Posiflush)
                                                        No Longer Active             

                                                                            2017      

                                        [facility]                    

 

                          Omnipaque 300 injectable solution                            85 mL, Route: IVP, Drug

 Form: SOLN, Dosing Weight 81.818, kg, ONCALL, GFR > 45 mL/min, STAT, Start 
date: 17 15:36:00 CST, Duration: 1 doses or timesNotes: (Same as:Omnipaque
 300).  WASTE: F/P - Black; E - Municipal Trash Bin                                

                          Inactive                                                     

                                                2017                            [facility]      

              

 

                          Omnipaque 300 injectable solution                            100 mL, Route: IVP, Dosing

 Weight 81.818, kg, ONCALL, GFR > 45 mL/min, STAT, Start date: 17 15:22:00
 CST, Duration: 1 doses or times                                                   

                    Inactive                                                                          

                          2017                            [facility]                    

 

                          Omnipaque 350 injectable solution                            125 mL, Route: IVP, Drug

 Form: SOLN, Dosing Weight 81.818, kg, ONCALL, For CTA exam with GFR > 45 
mL/min, STAT, Start date: 17 14:21:00 CST, Duration: 1 doses or 
timesNotes: (same as:Omnipaque 350).  WASTE: F/P - Black; E - Municipal Trash 
Bin                                                        Inactive                  

                                                                            2017           

                                        [facility]                    

 

                          Saline Flush 0.9%                            10 mL, Route: IVP, Drug Form: INJ, Dosing

 Weight 81.818, kg, PRN, PRN Line Flush, Start date: 17 12:56:00 CST, 
Duration: 30 day, Stop date: 17 13:55:00 CDTNotes: (Same as: BD Posiflush)
                                                        Inactive                     

                                                                            2017              

                                        [facility]                    

 

                          Levofloxacin 750 MG Oral Tablet [Levaquin]                            750 mg=1 tab,

 PO, Q24H, # 5 tab, 0 Refill(s)                                                    

                    Active                                                                             

                          2015                            [facility]                    

 

                          aspirin                            324 mg, 4 tab, Route: PO, Drug form: CHEWTAB, ONCE,

 Dosing Weight 81.818, kg, Priority: STAT, Start date: 01/22/15 15:05:00, Stop 
date: 01/22/15 15:05:00Notes: Take with food.                                      

                    Inactive                                                             

                          2015                            [facility]            

        

 

                          Saline Flush 0.9%                            10 mL, Route: IVP, Drug Form: INJ, Dosing

 Weight 81.818, kg, PRN, PRN Line Flush, Start date: 01/22/15 15:05:00, 
Duration: 30 day, Stop date: 02/21/15 15:04:00Notes: (Same as: BD Posiflush)    
                                                      Inactive                         

                                                                            2015                  

                                        [facility]                    



                                                                                
                                                                                
                                                                                
                                                                                
                                                        



Allergies, Adverse Reactions, Alerts

                    





                    Substance                            Category                            Reaction   

                          Severity                            Reaction type           

                          Status                            Date Reported                     

                          Comments                            Source                    

 

                    morphine                            Assertion                                       

                                                Drug allergy                            Active

                                                                                    [facility]                    

 

                    NKFA                            Assertion                                           

                                                Drug allergy                            Active

                                                                                     

[facility]                    

 

                    sulfa drugs                            Assertion                                    

                                                      Drug allergy                         

                    Active                                                                              

                                        [facility]                    



                                                                                
        



Immunizations

                    





                    Immunization                            Date Given                            Site  

                          Status                            Last Updated             

                          Comments                            Source                    



                                                                        



Results

                    





                    Order Name                            Results                            Value      

                          Reference Range                            Date                

                          Interpretation                            Comments                       

                                        Source                    

 

                    CARDIAC ENZYMES                            BNP                            34 pg/mL  

                           <=100 pg/mL                            2017         

                                                                            [facility]

                    

 

                    CHEM PANEL                            eGFR                            98 mL/min/1.73m2

                                                         2017                  

                                                      Result Comment: The eGFR is calculated using the

 CKD-EPI formula. In most young, healthy individuals the eGFR will be >90 
mL/min/1.73m2. The eGFR declines with age. An eGFR of 60-89 may be normal in 
some populations, particularly the elderly, for whom the CKD-EPI formula has not
 been extensively validated. Use of the eGFR is not recommended in the following
 populations:



Individuals with unstable creatinine concentrations, including pregnant patients
 and those with serious co-morbid conditions.



Patients with extremes in muscle mass or diet. 



The data above are obtained from the National Kid[employer] Education Program 
(NKDEP) which additionally recommends that when the eGFR is used in patients 
with extremes of body mass index for purposes of drug dosing, the eGFR should be
 multiplied by the estimated BMI.                            [facility]          

          

 

                    CHEM PANEL                            Total Protein                            5.9 g/dL

                             6.4 - 8.4                            2017         

                                                                            [facility]

                    

 

                    CHEM PANEL                            B/C Ratio                            14       

                          6 - 25                            2017                  

                                                                            [facility]         

           

 

                    CHEM PANEL                            Globulin                            2.7 g/dL  

                           2.7 - 4.2                            2017           

                                                                            [facility]  

                  

 

                    CHEM PANEL                            Albumin Lvl                            3.2 g/dL

                             3.5 - 5.0                            2017         

                                                                            [facility]

                    

 

                    CHEM PANEL                            ALT                            70 unit/L      

                          0 - 65                            2017                  

                                                                            [facility]         

           

 

                    CHEM PANEL                            A/G Ratio                            1.2      

                          0.7 - 1.6                            2017              

                                                                            [facility]     

               

 

                    CHEM PANEL                            CO2                            26 meq/L       

                          24 - 32                            2017                  

                                                                            [facility]         

           

 

                    CHEM PANEL                            Chloride Lvl                            109 meq/L

                             95 - 109                            2017          

                                                                            [facility] 

                   

 

                    CHEM PANEL                            AGAP                            10.9 meq/L    

                          10.0 - 20.0                            2017           

                                                                            [facility]  

                  

 

                    CHEM PANEL                            Calcium Lvl                            7.9 mg/dL

                             8.5 - 10.5                            2017        

                                                                            [facility]

                    

 

                    CHEM PANEL                            BUN                            11 mg/dL       

                          7 - 22                            2017                   

                                                                            [facility]          

          

 

                    CHEM PANEL                            Glucose Lvl                            88 mg/dL

                             70 - 99                            2017           

                                                                            [facility]  

                  

 

                    CHEM PANEL                            Creatinine Lvl                            0.80

 mg/dL                             0.50 - 1.40                            2017 

                                                                                   [facility]

                    

 

                    CHEM PANEL                            Potassium Lvl                            3.9 meq/L

                             3.5 - 5.1                            2017         

                                                                            [facility]

                    

 

                    CHEM PANEL                            Sodium Lvl                            142 meq/L

                             135 - 145                            2017         

                                                                            [facility]

                    

 

                    CHEM PANEL                            Bili Total                            2.1 mg/dL

                             0.2 - 1.3                            2017         

                                                                            [facility]

                    

 

                    CHEM PANEL                            Alk Phos                            65 unit/L 

                            39 - 136                            2017           

                                                                            [facility]  

                  

 

                    CHEM PANEL                            AST                            37 unit/L      

                          0 - 37                            2017                  

                                                                            [facility]         

           

 

                    CHEM PANEL                            Procalcitonin Lvl                            <0.05

 ng/mL                              0.00 - 0.10                            2017

                                                                                    [facility]                    

 

                    CHEM PANEL                            eGFR                            98 mL/min/1.73m2

                                                         2017                  

                                                      Result Comment: The eGFR is calculated using the

 CKD-EPI formula. In most young, healthy individuals the eGFR will be >90 
mL/min/1.73m2. The eGFR declines with age. An eGFR of 60-89 may be normal in 
some populations, particularly the elderly, for whom the CKD-EPI formula has not
 been extensively validated. Use of the eGFR is not recommended in the following
 populations:



Individuals with unstable creatinine concentrations, including pregnant patients
 and those with serious co-morbid conditions.



Patients with extremes in muscle mass or diet. 



The data above are obtained from the National Kid[employer] Disease Education Program 
(NKDEP) which additionally recommends that when the eGFR is used in patients 
with extremes of body mass index for purposes of drug dosing, the eGFR should be
 multiplied by the estimated BMI.                            [facility]          

          

 

                    CHEM PANEL                            Chloride Lvl                            108 meq/L

                             95 - 109                            2017          

                                                                            [facility] 

                   

 

                    CHEM PANEL                            CO2                            26 meq/L       

                          24 - 32                            2017                  

                                                                            [facility]         

           

 

                    CHEM PANEL                            AGAP                            11.0 meq/L    

                          10.0 - 20.0                            2017           

                                                                            [facility]  

                  

 

                    CHEM PANEL                            Calcium Lvl                            7.9 mg/dL

                             8.5 - 10.5                            2017        

                                                                            [facility]

                    

 

                    CHEM PANEL                            Creatinine Lvl                            0.80

 mg/dL                             0.50 - 1.40                            2017 

                                                                                   [facility]

                    

 

                    CHEM PANEL                            Sodium Lvl                            141 meq/L

                             135 - 145                            2017         

                                                                            [facility]

                    

 

                    CHEM PANEL                            Potassium Lvl                            4.0 meq/L

                             3.5 - 5.1                            2017         

                                                                            [facility]

                    

 

                    CHEM PANEL                            Glucose Lvl                            89 mg/dL

                             70 - 99                            2017           

                                                                            [facility]  

                  

 

                    CHEM PANEL                            BUN                            11 mg/dL       

                          7 - 22                            2017                   

                                                                            [facility]          

          

 

                    CHEM PANEL                            A/G Ratio                            1.1      

                          0.7 - 1.6                            2017              

                                                                            [facility]     

               

 

                    CHEM PANEL                            Globulin                            2.8 g/dL  

                           2.7 - 4.2                            2017           

                                                                            [facility]  

                  

 

                    CHEM PANEL                            AST                            34 unit/L      

                          0 - 37                            2017                  

                                                                            [facility]         

           

 

                    CHEM PANEL                            Alk Phos                            66 unit/L 

                            39 - 136                            2017           

                                                                            [facility]  

                  

 

                    CHEM PANEL                            ALT                            70 unit/L      

                          0 - 65                            2017                  

                                                                            [facility]         

           

 

                    CHEM PANEL                            Total Protein                            6.0 g/dL

                             6.4 - 8.4                            2017         

                                                                            [facility]

                    

 

                    CHEM PANEL                            Bili Total                            2.1 mg/dL

                             0.2 - 1.3                            2017         

                                                                            [facility]

                    

 

                    CHEM PANEL                            Albumin Lvl                            3.2 g/dL

                             3.5 - 5.0                            2017         

                                                                            [facility]

                    

 

                    CHEM PANEL                            Bili Direct                            0.3 mg/dL

                             0.0 - 0.3                            2017         

                                                                            [facility]

                    

 

                    CHEM PANEL                            Bili Indirect                            1.8 mg/dL

                             0.0 - 1.0                            2017         

                                                                            [facility]

                    

 

                    HEMATOLOGY                            RDW                            13.2 %         

                          11.5 - 14.5                            2017                

                                                                            [facility] [place][note 2]                            MPV                            7.9 fL         

                          7.4 - 10.4                            2017                 

                                                                            [facility] [place][note 2]                            Hgb                            13.6 g/dL      

                          14.0 - 18.0                            2017             

                                                                            [facility] [place][note 2]                            Hct                            39.4 %         

                          42.0 - 54.0                            2017                

                                                                            [facility] [place][note 2]                            MCV                            87.0 fL        

                          80.0 - 94.0                            2017               

                                                                            [facility] [place][note 2]                            MCH                            30.0 pg        

                          27.0 - 31.0                            2017               

                                                                            [facility] [place][note 2]                            Platelet                            152 K/CMM 

                            133 - 450                            2017          

                                                                            [facility] [place][note 2]                            MCHC                            34.5 g/dL     

                          32.0 - 36.0                            2017            

                                                                            [facility]   

                 

 

                    HEMATOLOGY                            WBC                            5.3 K/CMM      

                          3.7 - 10.4                            2017              

                                                                            [facility] [place][note 2]                            RBC                            4.53 M/CMM     

                          4.70 - 6.10                            2017            

                                                                            [facility] [place][note 2]                            Monocytes #                            0.5 K/CMM

                             0.0 - 0.8                            2017         

                                                                            [facility] [place][note 2]                            Basophils #                            0.1 K/CMM

                             0.0 - 0.2                            2017         

                                                                            [facility] [place][note 2]                            Eosinophils #                            0.2 K/CMM

                             0.0 - 0.5                            2017         

                                                                            [facility]

                    

 

                    HEMATOLOGY                            Monocytes                            10.2 %   

                          2.0 - 12.0                            2017           

                                                                            [facility]  

                  

 

                    HEMATOLOGY                            Eosinophils                            4.0 %  

                           0.0 - 4.0                            2017           

                                                                            [facility] [place][note 2]                            Lymphocytes                            30.2 % 

                            20.0 - 40.0                            2017        

                                                                            [facility] [place][note 2]                            Segs                            54.3 %        

                          45.0 - 75.0                            2017               

                                                                            [facility] [place][note 2]                            Basophils                            1.3 %    

                          0.0 - 1.0                            2017             

                                                                            [facility] [place][note 2]                            Segs-Bands #                            2.9 K/CMM

                             1.5 - 8.1                            2017         

                                                                            [facility] [place][note 2]                            Lymphocytes #                            1.6 K/CMM

                             1.0 - 5.5                            2017         

                                                                            [facility]                            Hep B Core IgM                            Negative

 

*NA*

                    (17 11:47 PM)                              Negative                            

2017                                                                           

                                        [facility]                            Hep C Ab                            Negative 

*NA*

                    (17 11:47 PM)                                                          2017

                                                                                    [facility]                            Hep Bs Ag                            Negative 



*NA*

                    (17 11:47 PM)                              Negative                            

2017                                                                           

                                        [facility]                            Hep A IgM                            Negative 



*NA*

                    (17 11:47 PM)                              Negative                            

2017                                                                           

                                        [facility]                    

 

                          Chest  Pulmonary Embolism CTA                            Chest  Pulmonary Embolism

 CTA                                    Patient Name: EH NOBLES

: 1960; Age: 57 years  y/o Male

MR: [id number]



Study: Chest  Pulmonary Embolism CTA 2017 5:54 PM CST

Ordering Physician:    



Clinical Indication: chest pain, dyspnea;    

Comparison: None



TECHNIQUE: Sequential trans-axial images were obtained thru the chest and upper 
abdomen after administration of iodinated contrast. Coronal and sagittal 
reconstructions were obtained with maximal intensity projection images. 95 cc of
 nonionic contrast material was used for the exam.

Dose:  HIK=848 mGy-cm



FINDINGS: 

LUNG PARENCHYMA AND PLEURA: Lungs show moderate dependent atelectasis. There are
 no pleural effusions.   There is no pneumothorax.



AIRWAY: The central airway is normal.

                                        .



MEDIASTINUM: No significant mediastinal lymphadenopathy. 



HEART: There is no evidence of RV strain.  The cardiac chambers are otherwise 
unremarkable.  There is no pericardial effusion. 



VASCULAR STRUCTURES: There are no segmental pulmonary emboli noted.   The main, 
right and left pulmonary arteries are normal. The great vessels are 
unremarkable. The thoracic aorta is is free of aneurysm or dissection.. The 
superior vena cava is unremarkable.



OSSEOUS STRUCTURES: There are no definite significant osseous abnormalities 
seen.





VISUALIZED UPPER ABDOMEN: The visualized upper abdomen is within normal limits.



IMPRESSION:

                                        1. No evidence of pulmonary emboli.

                                        2. Moderate dependent atelectasis.





SL:  [id number]



                                                          2017                 

                                                      -

                                        -





Read by:  Sharad Coats MD

Dictated Date/time:  17 22:02

Electronically Signed by:  Sharad Coats MD                           17 
22:17

FINAL REPORT

                                        [facility]                    

 

                    CARDIAC ENZYMES                            Troponin-I                            null

                            0.00 - 0.40                            2017        

                                                                            [facility]

                    

 

                    CARDIAC ENZYMES                            CK MB                            0.7 ng/mL

                             0.5 - 3.6                            2017         

                                                                            [facility]

                    

 

                    CARDIAC ENZYMES                            Total CK                            101 unit/L

                             12 - 191                            2017          

                                                                            [facility] 

                   

 

                    CARDIAC ENZYMES                            CK MB Index                            0.7

                              0.0 - 2.5                            2017        

                                                                            [facility]

                    

 

                    CHEM PANEL                            A/G Ratio                            1.4      

                          0.7 - 1.6                            2017              

                                                                            [facility]     

               

 

                    CHEM PANEL                            Total Protein                            7.5 g/dL

                             6.4 - 8.4                            2017         

                                                                            [facility]

                    

 

                    CHEM PANEL                            Sodium Lvl                            140 meq/L

                             135 - 145                            2017         

                                                                            [facility]

                    

 

                    CHEM PANEL                            Creatinine Lvl                            1.20

 mg/dL                             0.50 - 1.40                            2017 

                                                                                   [facility]

                    

 

                    CHEM PANEL                            BUN                            10 mg/dL       

                          7 - 22                            2017                   

                                                                            [facility]          

          

 

                    CHEM PANEL                            Glucose Lvl                            91 mg/dL

                             70 - 99                            2017           

                                                                            [facility]  

                  

 

                    CHEM PANEL                            AGAP                            13.8 meq/L    

                          10.0 - 20.0                            2017           

                                                                            [facility]  

                  

 

                    CHEM PANEL                            Bili Total                            1.9 mg/dL

                             0.2 - 1.3                            2017         

                                                                            [facility]

                    

 

                    CHEM PANEL                            Alk Phos                            92 unit/L 

                            39 - 136                            2017           

                                                                            [facility]  

                  

 

                    CHEM PANEL                            Globulin                            3.1 g/dL  

                           2.7 - 4.2                            2017           

                                                                            [facility]  

                  

 

                    CHEM PANEL                            B/C Ratio                            8        

                          6 - 25                            2017                   

                                                                            [facility]          

          

 

                    CHEM PANEL                            Calcium Lvl                            8.7 mg/dL

                             8.5 - 10.5                            2017        

                                                                            [facility]

                    

 

                    CHEM PANEL                            CO2                            27 meq/L       

                          24 - 32                            2017                  

                                                                            [facility]         

           

 

                    CHEM PANEL                            Chloride Lvl                            103 meq/L

                             95 - 109                            2017          

                                                                            [facility] 

                   

 

                    CHEM PANEL                            Potassium Lvl                            3.8 meq/L

                             3.5 - 5.1                            2017         

                                                                            [facility]

                    

 

                    CHEM PANEL                            AST                            55 unit/L      

                          0 - 37                            2017                  

                                                                            [facility]         

           

 

                    CHEM PANEL                            ALT                            98 unit/L      

                          0 - 65                            2017                  

                                                                            [facility]         

           

 

                    CHEM PANEL                            Albumin Lvl                            4.4 g/dL

                             3.5 - 5.0                            2017         

                                                                            [facility]

                    

 

                    CHEM PANEL                            eGFR                            66 mL/min/1.73m2

                                                         2017                  

                                                      Result Comment: The eGFR is calculated using the

 CKD-EPI formula. In most young, healthy individuals the eGFR will be >90 
mL/min/1.73m2. The eGFR declines with age. An eGFR of 60-89 may be normal in 
some populations, particularly the elderly, for whom the CKD-EPI formula has not
 been extensively validated. Use of the eGFR is not recommended in the following
 populations:



Individuals with unstable creatinine concentrations, including pregnant patients
 and those with serious co-morbid conditions.



Patients with extremes in muscle mass or diet. 



The data above are obtained from the National Kidney Disease Education Program 
(NKDEP) which additionally recommends that when the eGFR is used in patients 
with extremes of body mass index for purposes of drug dosing, the eGFR should be
 multiplied by the estimated BMI.                            [facility] [place][note 2]                            Platelet                            214 K/CMM 

                            133 - 450                            2017          

                                                                            [facility] [place][note 2]                            MPV                            7.6 fL         

                          7.4 - 10.4                            2017                 

                                                                            [facility] [place][note 2]                            RDW                            13.0 %         

                          11.5 - 14.5                            2017                

                                                                            [facility] [place][note 2]                            MCH                            31.2 pg        

                          27.0 - 31.0                            2017               

                                                                            [facility] [place][note 2]                            MCHC                            34.8 g/dL     

                          32.0 - 36.0                            2017            

                                                                            [facility] [place][note 2]                            MCV                            89.9 fL        

                          80.0 - 94.0                            2017               

                                                                            [facility] [place][note 2]                            RBC                            4.99 M/CMM     

                          4.70 - 6.10                            2017            

                                                                            [facility] [place][note 2]                            WBC                            7.3 K/CMM      

                          3.7 - 10.4                            2017              

                                                                            [facility] [place][note 2]                            Hct                            44.9 %         

                          42.0 - 54.0                            2017                

                                                                            [facility] [place][note 2]                            Hgb                            15.6 g/dL      

                          14.0 - 18.0                            2017             

                                                                            [facility] [place][note 2]                            Basophils #                            0.1 K/CMM

                             0.0 - 0.2                            2017         

                                                                            [facility]

                    

 

                    HEMATOLOGY                            Basophils                            0.9 %    

                          0.0 - 1.0                            2017             

                                                                            [facility]    

                

 

                    HEMATOLOGY                            Eosinophils                            2.2 %  

                           0.0 - 4.0                            2017           

                                                                            [facility]  

                  

 

                    HEMATOLOGY                            Monocytes                            11.6 %   

                          2.0 - 12.0                            2017           

                                                                            [facility] [place][note 2]                            Lymphocytes                            26.1 % 

                            20.0 - 40.0                            2017        

                                                                            [facility] [place][note 2]                            Eosinophils #                            0.2 K/CMM

                             0.0 - 0.5                            2017         

                                                                            [facility]

                    

 

                    HEMATOLOGY                            Monocytes #                            0.8 K/CMM

                             0.0 - 0.8                            2017         

                                                                            [facility] [place][note 2]                            Lymphocytes #                            1.9 K/CMM

                             1.0 - 5.5                            2017         

                                                                            [facility]

                    

 

                    HEMATOLOGY                            Segs-Bands #                            4.3 K/CMM

                             1.5 - 8.1                            2017         

                                                                            [facility] [place][note 2]                            Segs                            59.2 %        

                          45.0 - 75.0                            2017               

                                                                            [facility]      

              

 

                    Chest 1view DX                            Chest 1view DX                            

Clinical Indication: Chest pain; 

Comparison: 2015



FINDINGS: 



AP chest radiographs shows hypoaerated lung volumes without interstitial or 
airspace opacities, pleural effusions or pneumothorax. 



The heart size and pulmonary vasculature are normal. The trachea is midline. 
There are no clinically significant osseous abnormalities noted. 



IMPRESSION:

No chest radiographic evidence of acute cardiopulmonary disease.





SL:  [id number]



                                                          2017                 

                                                      -

                                        -





Read by:  Wenceslao Polanco MD

Dictated Date/time:  17 16:09

Electronically Signed by:  Wenceslao Polanco MD               17 
16:09

FINAL REPORT

                                        [facility]                    

 

                    CARDIAC ENZYMES                            CK MB Index                            null

                            0.0 - 2.5                            2017          

                                                                            [facility] 

                   

 

                    CARDIAC ENZYMES                            BNP                            29 pg/mL  

                           <=100 pg/mL                            2017         

                                                                            [facility]

                    

 

                    CARDIAC ENZYMES                            Total CK                            73 unit/L

                             12 - 191                            2017          

                                                                            [facility] 

                   

 

                    CARDIAC ENZYMES                            CK MB                            null    

                          0.5 - 3.6                            2017              

                                                                            [facility]     

               

 

                    CARDIAC ENZYMES                            Troponin-I                            null

                            0.00 - 0.40                            2017        

                                                                            [facility]

                    

 

                    CHEM PANEL                            Lipase Lvl                            323 unit/L

                             73 - 393                            2017          

                                                                            [facility] 

                   

 

                    CHEM PANEL                            eGFR                            86 mL/min/1.73m2

                                                         2017                  

                                                      Result Comment: The eGFR is calculated using the

 CKD-EPI formula. In most young, healthy individuals the eGFR will be >90 
mL/min/1.73m2. The eGFR declines with age. An eGFR of 60-89 may be normal in 
some populations, particularly the elderly, for whom the CKD-EPI formula has not
 been extensively validated. Use of the eGFR is not recommended in the following
 populations:



Individuals with unstable creatinine concentrations, including pregnant patients
 and those with serious co-morbid conditions.



Patients with extremes in muscle mass or diet. 



The data above are obtained from the National Kidney Disease Education Program 
(NKDEP) which additionally recommends that when the eGFR is used in patients 
with extremes of body mass index for purposes of drug dosing, the eGFR should be
 multiplied by the estimated BMI.                            [facility]          

          

 

                    CHEM PANEL                            A/G Ratio                            1.4      

                          0.7 - 1.6                            2017              

                                                                            [facility]     

               

 

                    CHEM PANEL                            Creatinine Lvl                            0.96

 mg/dL                             0.50 - 1.40                            2017 

                                                                                   [facility]

                    

 

                    CHEM PANEL                            Sodium Lvl                            140 meq/L

                             135 - 145                            2017         

                                                                            [facility]

                    

 

                    CHEM PANEL                            ALT                            55 unit/L      

                          0 - 65                            2017                  

                                                                            [facility]         

           

 

                    CHEM PANEL                            Globulin                            3.1 g/dL  

                           2.7 - 4.2                            2017           

                                                                            [facility]  

                  

 

                    CHEM PANEL                            B/C Ratio                            10       

                          6 - 25                            2017                  

                                                                            [facility]         

           

 

                    CHEM PANEL                            Bili Total                            1.4 mg/dL

                             0.2 - 1.3                            2017         

                                                                            [facility]

                    

 

                    CHEM PANEL                            AGAP                            10.3 meq/L    

                          10.0 - 20.0                            2017           

                                                                            [facility]  

                  

 

                    CHEM PANEL                            AST                            32 unit/L      

                          0 - 37                            2017                  

                                                                            [facility]         

           

 

                    CHEM PANEL                            Alk Phos                            82 unit/L 

                            39 - 136                            2017           

                                                                            [facility]  

                  

 

                    CHEM PANEL                            Total Protein                            7.3 g/dL

                             6.4 - 8.4                            2017         

                                                                            [facility]

                    

 

                    CHEM PANEL                            Albumin Lvl                            4.2 g/dL

                             3.5 - 5.0                            2017         

                                                                            [facility]

                    

 

                    CHEM PANEL                            CO2                            31 meq/L       

                          24 - 32                            2017                  

                                                                             [place]         

           

 

                    CHEM PANEL                            Calcium Lvl                            8.5 mg/dL

                             8.5 - 10.5                            2017        

                                                                            [facility]

                    

 

                    CHEM PANEL                            Chloride Lvl                            103 meq/L

                             95 - 109                            2017          

                                                                            [facility] 

                   

 

                    CHEM PANEL                            Potassium Lvl                            4.3 meq/L

                             3.5 - 5.1                            2017         

                                                                            [facility]

                    

 

                    CHEM PANEL                            BUN                            10 mg/dL       

                          7 - 22                            2017                   

                                                                            [facility]          

          

 

                    CHEM PANEL                            Glucose Lvl                            92 mg/dL

                             70 - 99                            2017           

                                                                            [facility]  

                  

 

                    HEMATOLOGY                            Basophils #                            0.1 K/CMM

                             0.0 - 0.2                            2017         

                                                                            [facility]

                    

 

                    HEMATOLOGY                            Eosinophils #                            0.1 K/CMM

                             0.0 - 0.5                            2017         

                                                                            [facility]

                    

 

                    HEMATOLOGY                            Monocytes #                            0.6 K/CMM

                             0.0 - 0.8                            2017         

                                                                            [facility]

                    

 

                    HEMATOLOGY                            Lymphocytes #                            1.6 K/CMM

                             1.0 - 5.5                            2017         

                                                                            [facility]

                    

 

                    HEMATOLOGY                            Segs-Bands #                            5.9 K/CMM

                             1.5 - 8.1                            2017         

                                                                            [facility] [place][note 2]                            Basophils                            1.0 %    

                          0.0 - 1.0                            2017             

                                                                            [facility] [place][note 2]                            Eosinophils                            1.7 %  

                           0.0 - 4.0                            2017           

                                                                            [facility] [place][note 2]                            Monocytes                            7.3 %    

                          2.0 - 12.0                            2017            

                                                                            [facility] [place][note 2]                            Segs                            70.5 %        

                          45.0 - 75.0                            2017               

                                                                            [facility] [place][note 2]                            Lymphocytes                            19.5 % 

                            20.0 - 40.0                            2017        

                                                                            [facility] [place][note 2]                            MPV                            7.8 fL         

                          7.4 - 10.4                            2017                 

                                                                            [facility] [place][note 2]                            Platelet                            218 K/CMM 

                            133 - 450                            2017          

                                                                            [facility] [place][note 2]                            RDW                            13.2 %         

                          11.5 - 14.5                            2017                

                                                                            [facility] [place][note 2]                            MCH                            29.4 pg        

                          27.0 - 31.0                            2017               

                                                                            [facility] [place][note 2]                            MCV                            88.3 fL        

                          80.0 - 94.0                            2017               

                                                                            [facility] [place][note 2]                            Hct                            48.3 %         

                          42.0 - 54.0                            2017                

                                                                            [facility] [place][note 2]                            MCHC                            33.3 g/dL     

                          32.0 - 36.0                            2017            

                                                                            [facility] [place][note 2]                            Hgb                            16.1 g/dL      

                          14.0 - 18.0                            2017             

                                                                            [facility] [place][note 2]                            RBC                            5.47 M/CMM     

                          4.70 - 6.10                            2017            

                                                                            [facility] [place][note 2]                            WBC                            8.3 K/CMM      

                          3.7 - 10.4                            2017              

                                                                            [facility] [place][note 2]                            PT                            13.2 s          

                          12.0 - 14.7                            2017                 

                                                                            [facility] [place][note 2]                            INR                            0.98           

                          0.85 - 1.17                            2017                 

                                                                            [facility]        

            

 

                          Chest/Abdomen/Pelvis w IV contrast CT                            Chest/Abdomen/Pelvis

 w IV contrast CT                            Chest/Abdomen/Pelvis w IV contrast CT 

2017 1:15 PM CST

Ordering Physician: Farooq Greco MD



CLINICAL INDICATION: Chest pain; R/o Dissection; PT C/O chest pain and upper 
back pain x 1 week. 85mls omnipaque 300 injected. [id number]



COMPARISON: CT chest 2015 and abdominopelvic CT 2011



TECHNIQUE: 5 mm thick axial images of the chest, abdomen, and pelvis were 
obtained with IV contrast via arteriographic protocol. Delayed axial images were
 obtained. Coronal and sagittal reformations were created.



FINDINGS:



Thoracic and abdominal aorta are normal, with no dissection, aneurysm, focal 
ectasia, atheromatous plaque formation, or periaortic fat stranding. Great arch 
vessels are patent and normal. Celiac axis, SMA, JOSE, and bilateral single main 
renal arteries are patent and normal to their respective distal perfusional 
territories. Bilateral iliofemoral arterial systems are patent and normal.



Lungs are clear. Pleural spaces are clear. No mediastinal or hilar 
lymphadenopathy is present. Heart size is normal. Thoracic aorta demonstrates 
mild aortic arch calcified plaque, but is otherwise normal. Bones of the 
thoracic cage are normal.



Liver, spleen, pancreas, adrenal glands, and kidneys are normal. Ureters are 
normal. Bladder is partially distended. Gallbladder is present.



Stomach, small intestine, and colon are normal . Appendix is normal.



No mesenteric or retroperitoneal lymphadenopathy is present. No free air or free
 fluid is present.    



Remaining bones demonstrate are normal.





IMPRESSION:



No acute abnormality of the chest, abdomen, or pelvis. Normal thoracic and 
abdominal aorta.







SL: [id number] 



                                                          2017                 

                                                      -

                                        -





Read by:  Ilana Casper MD

Dictated Date/time:  17 15:56

Electronically Signed by:  Ilana Casper MD               17 
16:02

FINAL REPORT

                                        [facility]                    

 

                          Soft Tissue Head/Neck US                            Soft Tissue Head/Neck US      

                                        EXAM: Soft Tissue Head/Neck US

HISTORY:  Right neck mass

COMPARISON: None

 

Sonographic imaging of the area of interest in the right neck.

 

IMPRESSION: There is a morphologically normal appearing lymph node at the area 
of concern in the right neck which measures 1.7 x 0.5 x 0.5 cm.

                                                          2015                 

                                                      -

                                        -





Read by:  Alice Tiwari MD

Dictated Date/time:  09/25/15 15:56

Electronically Signed by:  Alice Tiwari MD                    09/25/15 
15:57

FINAL REPORT

                                         [facility]                    

 

                    CARDIAC ENZYMES                            Total CK                            108 unit/L

                             12 - 191                            2015          

                                                                            [facility] 

                   

 

                    CARDIAC ENZYMES                            CK MB                            null    

                          0.5 - 3.6                            2015              

                                                                            [facility]     

               

 

                    CARDIAC ENZYMES                            Troponin-I                            null

                            0.00 - 0.40                            2015        

                                                                            [facility]

                    

 

                    CARDIAC ENZYMES                            BNP                            35 pg/mL  

                           <=100 pg/mL                            2015         

                                                      3Interpretive Data: Elevated results are

 in line with increasing severity of

congestive heart failure. Minor elevations between 100 and 300

may be seen with Myocardial Ischemia, Sodium retaining drugs,

and compensated/treated heart failure.                            [facility]     

               

 

                    CARDIAC ENZYMES                            CK MB Index                            null

                            0.0 - 2.5                            2015          

                                                                            [facility] 

                   

 

                    CHEM PANEL                            eGFR                            95 mL/min/1.73m2

                                                         2015                  

                                                      1Result Comment: The eGFR is calculated using the

 CKD-EPI formula. In most young, healthy individuals the eGFR will be >90 
mL/min/1.73m2. The eGFR declines with age. An eGFR of 60-89 may be normal in 
some populations, particularly the elderly, for whom the CKD-EPI formula has not
 been extensively validated. Use of the eGFR is not recommended in the following
 populations:



Individuals with unstable creatinine concentrations, including pregnant patients
 and those with serious co-morbid conditions.



Patients with extremes in muscle mass or diet. 



The data above are obtained from the National Kidney Disease Education Program 
(NKDEP) which additionally recommends that when the eGFR is used in patients 
with extremes of body mass index for purposes of drug dosing, the eGFR should be
 multiplied by the estimated BMI.                            [facility]          

          

 

                    CHEM PANEL                            Potassium Lvl                            3.8 meq/L

                             3.5 - 5.1                            2015         

                                                                            [facility]

                    

 

                    CHEM PANEL                            Chloride Lvl                            107 meq/L

                             95 - 109                            2015          

                                                                            [facility] 

                   

 

                    CHEM PANEL                            CO2                            26 meq/L       

                          24 - 32                            2015                  

                                                                            [facility]         

           

 

                    CHEM PANEL                            Glucose Lvl                            126 mg/dL

                             70 - 99                            2015           

                                                      2Interpretive Data: Adult reference range

 values reflect the clinical guidelines

of the American Diabetes Association.                            [facility]      

              

 

                    CHEM PANEL                            BUN                            9 mg/dL        

                          7 - 22                            2015                    

                                                                            [facility]           

         

 

                    CHEM PANEL                            AGAP                            12.8 meq/L    

                          10.0 - 20.0                            2015           

                                                                            [facility]  

                  

 

                    CHEM PANEL                            Calcium Lvl                            8.8 mg/dL

                             8.5 - 10.5                            2015        

                                                                            [facility]

                    

 

                    CHEM PANEL                            Creatinine Lvl                            0.9 

mg/dL                             0.5 - 1.4                            2015    

                                                                                [facility]

                    

 

                    CHEM PANEL                            Sodium Lvl                            142 meq/L

                             135 - 145                            2015         

                                                                            [facility] [place][note 2]                            MPV                            7.9 fL         

                          7.4 - 10.4                            2015                 

                                                                            [facility] [place][note 2]                            MCHC                            33.6 g/dL     

                          32.0 - 36.0                            2015            

                                                                            [facility] [place][note 2]                            RDW                            12.8 %         

                          11.5 - 14.5                            2015                

                                                                            [facility] [place][note 2]                            Platelet                            209 K/CMM 

                            133 - 450                            2015          

                                                                            [facility] [place][note 2]                            MCH                            30.2 pg        

                          27.0 - 31.0                            2015               

                                                                            [facility] [place][note 2]                            WBC                            6.9 K/CMM      

                          3.7 - 10.4                            2015              

                                                                            [facility] [place][note 2]                            RBC                            5.04 M/CMM     

                          4.70 - 6.10                            2015            

                                                                            [facility] [place][note 2]                            Hgb                            15.2 g/dL      

                          14.0 - 18.0                            2015             

                                                                            [facility] [place][note 2]                            Hct                            45.3 %         

                          42.0 - 54.0                            2015                

                                                                            [facility] [place][note 2]                            MCV                            89.8 fL        

                          80.0 - 94.0                            2015               

                                                                            [facility] [place][note 2]                            INR                            0.96           

                          0.85 - 1.17                            2015                 

                                                      4Interpretive Data: RECOMMENDED RANGES FOR PROTIME

 INR:

   2.0-3.0 for most medical and surgical thromboembolic states.

   2.5-3.5 for artificial heart valves and recurrent embolism.



INR SHOULD BE USED ONLY FOR PATIENTS ON STABLE ANTICOAGULANT THERAPY.           
                                        [facility] [place][note 2]                            PTT                            31.1 s         

                          22.9 - 35.8                            2015                

                                                      5Interpretive Data: Heparin Therapeutic Range:

  57 - 92 Seconds                            [facility] [place][note 2]                            PT                            12.8 s          

                          12.0 - 14.7                            2015                 

                                                                            [facility] [place][note 2]                            Basophils #                            0.1 K/CMM

                             0.0 - 0.2                            2015         

                                                                            [facility] [place][note 2]                            Eosinophils #                            0.2 K/CMM

                             0.0 - 0.5                            2015         

                                                                            [facility] [place][note 2]                            Segs-Bands #                            4.2 K/CMM

                             1.5 - 8.1                            2015         

                                                                            [facility] [place][note 2]                            Basophils                            1.3 %    

                          0.0 - 1.0                            2015             

                                                                            [facility] [place][note 2]                            Lymphocytes #                            2.0 K/CMM

                             1.0 - 5.5                            2015         

                                                                            [facility]

                    

 

                    HEMATOLOGY                            Segs                            60.9 %        

                          45.0 - 75.0                            2015               

                                                                            [facility] [place][note 2]                            Lymphocytes                            28.8 % 

                            20.0 - 40.0                            2015        

                                                                            [facility]

                    

 

                    HEMATOLOGY                            Monocytes                            6.7 %    

                          2.0 - 12.0                            2015            

                                                                            [facility] [place][note 2]                            Monocytes #                            0.5 K/CMM

                             0.0 - 0.8                            2015         

                                                                            [facility]

                    

 

                    HEMATOLOGY                            Eosinophils                            2.3 %  

                           0.0 - 4.0                            2015           

                                                                            [facility]  

                  

 

                          Chest  Pulmonary Embolism CTA                            Chest  Pulmonary Embolism

 CTA                                    PROCEDURE:  Chest CTA  pulm emb  

REASON FOR EXAM:   chest pain shortness of breath, also history of dilated aorta
  

CLINICAL INDICATION:  Chest pain  

 

COMPARISON: None.

 

Chest with IV contrast:

 

There is no filling defect in the main, right or left pulmonary arteries. The 
aorta is normal in caliber, there is no dissection.

 

No technically enlarged mediastinal or axillary lymph nodes. Inferior lingular 
opacity series 2, image 115 measuring 3 cm in diameter. No pleural effusions or 
pneumothorax. Patent airway. Normal pulmonary vasculature. Subcentimeter left 
renal lesions are incompletely imaged and are too small to be accurately 
characterized, low in probability of being clinically significant.

 

                                        3-D reformatted images postcontrast demonstrate no filling defect.

 

 

IMPRESSION:

                                        1. Inferior lingular opacity suspicious for pneumonia, alveolitis, scarring. Follow-

up examination to document resolution/ stability after treatment is recommended 
as neoplasm is considered less likely but not entirely excluded.

 

These findings are communicated to Dr. Posey at 5:42 p.m. 

 

SL: 14

                                                          2015                 

                                                      -

                                        -





Read by:  Kerwin Coleman MD

Dictated Date/time:  01/22/15 17:39

Electronically Signed by:  Kerwin Coleman MD                        01/22/15 
18:01

FINAL REPORT

                                        [facility]                    

 

                    Chest 1view                            Chest 1view                            Examination:

 Chest x-ray, single view

 

History: Chest pain

 

Comparison: 2006

 

Findings: The lungs are clear and without focal consolidation. The 
cardiomediastinal silhouette is within normal limits. No pleural effusion or 
pneumothorax is seen. 

 

Fusion hardware in the cervical spine is seen. 

 

IMPRESSION: No acute cardiopulmonary disease. 

 

SL:  16

                                                          2015                 

                                                      -

                                        -





Read by:  Jakob Landry MD

Dictated Date/time:  01/22/15 15:56

Electronically Signed by:  Jakob Landry MD                         01/22/15 
15:56

FINAL REPORT

                                        [facility] [place][note 2]                            POC Hematocrit                            36.0

 %                             42.0 - 54.0                            2014     

                                                                               [facility] [place][note 2]                            POC Creatinine                            1.0 

mg/dL                             0.5 - 1.4                            2014    

                                                                                [facility] [place][note 2]                            POC Glucose                            100 mg/dL

                             70 - 99                            2014           

                                                                            [facility] [place][note 2]                            POC AGAP                            17.0 meq/L

                             10.0 - 20.0                            2014       

                                                                             [facility] [place][note 2]                            POC Hemoglobin                            12.2

 g/dL                             14.0 - 18.0                            2014  

                                                                                  [facility] [place][note 2]                            POC Ion Ca                            1.18 mMol/L

                             1.05 - 1.25                            2014       

                                                                             [facility] [place][note 2]                            eGFR                            84 mL/min/1.73m2

                                                         2014                  

                                                      1Result Comment: The eGFR is calculated using the

 CKD-EPI formula. In most young, healthy individuals the eGFR will be >90 
mL/min/1.73m2. The eGFR declines with age. An eGFR of 60-89 may be normal in 
some populations, particularly the elderly, for whom the CKD-EPI formula has not
 been extensively validated. Use of the eGFR is not recommended in the following
 populations:



Individuals with unstable creatinine concentrations, including pregnant patients
 and those with serious co-morbid conditions.



Patients with extremes in muscle mass or diet. 



The data above are obtained from the National Kidney Disease Education Program 
(NKDEP) which additionally recommends that when the eGFR is used in patients 
with extremes of body mass index for purposes of drug dosing, the eGFR should be
 multiplied by the estimated BMI.                            [facility] [place][note 2]                            POC Sodium                            140 meq/L

                             135 - 145                            2014         

                                                                            [facility] [place][note 2]                            POC BUN                            10 mg/dL   

                          7 - 22                            2014               

                                                                            [facility] [place][note 2]                            POC Chloride                            104 meq/L

                             95 - 109                            2014          

                                                                            [facility] [place][note 2]                            POC Carbon Dioxide                            

24 mEq/dL                             24 - 32                            2014  

                                                                                  [facility] [place][note 2]                            POC Potassium                            3.4 meq/L

                             3.5 - 5.1                            2014         

                                                                            [facility]

                    



                                                                                
                                                                                
                                                                                
                                                                                
                                                                                
                                                                                
                                                                                
                                                                                
                                                                                
                                                                                
                                                                                
                                                                                
                                                                                
                                                                                
                                                                                
                                                                                
                                                                                
                                                                                
                                                                                
                                                                                
                                                                                
                                                                                
                                                                                
                                                                                
                                                                                
                                                                                
                                                                                
                                                                                
                                                                                
                                                                                
                                                                                
                                                                                
                                                                                
                                                                                
                                                                                
                                                                                
                                                                                
                                                                                
                                                                                
                                                                                
                                                                                
                                                                                
                                                                                
                



Vital Signs

                    





                    Vital Sign                            Value                            Date         

                          Comments                            Source                    

 

                    Systolic (mm Hg)                            117                             2017

                                                        [facility]                 

   

 

                    Diastolic (mm Hg)                            68                             2017

                                                        [facility]                 

   

 

                    Respitory Rate                            18                             2017 

                                                       [facility]                  

  

 

                    Heart Rate                            53                             2017     

                                                      [facility]                    

 

                    Temperature Oral (F)                            97.9 F                            2017

                                                        [facility]                 

   

 

                    Respitory Rate                            18                             2017 

                                                       [facility]                  

  

 

                    Systolic (mm Hg)                            106                             2017

                                                        [facility]                 

   

 

                    Diastolic (mm Hg)                            67                             2017

                                                        [facility]                 

   

 

                    Temperature Oral (F)                            97 F                            2017

                                                        [facility]                 

   

 

                    Heart Rate                            52                             2017     

                                                      [facility]                    

 

                    Respitory Rate                            18                             2017 

                                                       [facility]                  

  

 

                    Heart Rate                            52                             2017     

                                                      [facility]                    

 

                    Systolic (mm Hg)                            112                             2017

                                                        [facility]                 

   

 

                    Diastolic (mm Hg)                            69                             2017

                                                        [facility]                 

   

 

                    Temperature Oral (F)                            97.4 F                            2017

                                                        [facility]                 

   

 

                    Weight                            81.818                             2017     

                                                      [facility]                    

 

                    Height                            187.96 cm                            2017   

                                                      [facility]                    



 

                    BMI Calculated                            23.16                             2017

                                                        [facility]                 

   

 

                    Weight                            81.8                             2017       

                                                      [facility]                    

 

                    BMI Calculated                            23.15                             2017

                                                        [facility]                 

   

 

                    Height                            187.96 cm                            2017   

                                                      [facility]                    



 

                    Weight                            81.818                             2017     

                                                      [facility]                    

 

                    Respitory Rate                            18                             2017 

                                                       [facility]                  

  

 

                    Systolic (mm Hg)                            111                             2017

                                                        [facility]                 

   

 

                    Diastolic (mm Hg)                            76                             2017

                                                        [facility]                 

   

 

                    Temperature Oral (F)                            98.6 F                            2017

                                                        [facility]                 

   

 

                    Heart Rate                            64                             2017     

                                                      [facility]                    

 

                    Temperature Oral (F)                            97.6 F                            2017

                                                        [facility]                 

   

 

                    Weight                            81.818                             2017     

                                                      [facility]                    

 

                    Respitory Rate                            18                             2017 

                                                       [facility]                  

  

 

                    Heart Rate                            54                             2017     

                                                      [facility]                    

 

                    Systolic (mm Hg)                            132                             2017

                                                        [facility]                 

   

 

                    Diastolic (mm Hg)                            88                             2017

                                                        [facility]                 

   

 

                    Diastolic (mm Hg)                            70                             2015

                                                        [facility]                 

   

 

                    Temperature Oral (F)                            97.8 F                            2015

                                                        [facility]                 

   

 

                    Systolic (mm Hg)                            119                             2015

                                                        [facility]                 

   

 

                    Respitory Rate                            18                             2015 

                                                       [facility]                  

  

 

                    Heart Rate                            51                             2015     

                                                      [facility]                    

 

                    Systolic (mm Hg)                            113                             2015

                                                        [facility]                 

   

 

                    Temperature Oral (F)                            97.8 F                            2015

                                                        [facility]                 

   

 

                    Respitory Rate                            18                             2015 

                                                       [facility]                  

  

 

                    Diastolic (mm Hg)                            72                             2015

                                                        [facility]                 

   

 

                    Heart Rate                            55                             2015     

                                                      [facility]                    

 

                    Weight                            81.818                             2015     

                                                      [facility]                    

 

                    BMI Calculated                            24.46                             2015

                                                        [facility]                 

   

 

                    Height                            182.88 cm                            2015   

                                                      [facility]                    



 

                    Weight                            85                             2014         

                                                      [facility]                    

 

                    BMI Calculated                            24.06                             2014

                                                        [facility]                 

   

 

                    Height                            187.96 cm                            2014   

                                                      [facility]                    





                                                                                
                                                                                
                                                                                
                                                                                
                                                                                
                                                                                
                                                                                
                                                                                
                                                                                
                                                                                
                                                        



Encounters

                    





                    Location                            Location Details                            Encounter

 Type                            Encounter Number                            Reason For

 Visit                            Attending Provider                            ADM Date

                            DC Date                            Status                

                                        Source                    

 

                                                                        OD                            

[id number]                            338 - PAIN NEC                            AZAEL IBANEZATO JR                            10/22/2012                                    

                          Active                            [facility]                                               

                    Outpatient                            [id number]                            

                            Kong Mathuria                             2014                                                  

                                        [facility] [place]                                              

                          Outpt Diag Services                            [id number]                

                                                Swapan Priscilla                             2014

                            11/15/2014                                               

                                        [facility]                                               

                          [facility]                            [id number]                 

                                                Fabiana Posey                             2015                                               

                                        [facility] [place]                                                

                          Outpt Diag Services                            [id number]                  

                                                Swapan Priscilla                             2015                                               

                                        [facility]                                               

                    Emergency                            [id number]                             

                           Farooq Greco                             2017                                                  

                                        [facility]                                               

                          Observation                            [id number]                         

                                                Luzmaria Ryan                             2017                                               

                                        [facility]                    



                                                                                
                                                                            



Procedures

                    





                    Procedure                            Code                            Date           

                          Perfomer                            Comments                        

                                        Source                    

 

                          Procedure<sup>1</sup>                            [id number]                         

                    2012                                                        Neck Surgery C5 C7

 fused                                  [facility]                    

 

                          Procedure<sup>1</sup>                            [id number]                         

                    2012                                                        Neck Surgery C5 C7

 fused                                  [facility]                    

 

                          Repair of meniscus<sup>2</sup>                            [id number]               

                    2010                                                        left knee

                                        [facility]                    

 

                          Repair of meniscus<sup>2</sup>                            [id number]               

                    2010                                                        left knee

                                        [facility]                    

 

                          Cervical spinal fusion                            [id number]                        

                                                                                                    [facility]

## 2018-12-28 NOTE — XMS REPORT
Summary of Care: 17 - 17

                             Created on: 2027



EH NOBLES

External Reference #: [id number]

: 1960

Sex: Male



Demographics







                          Address                   [address] [place]  [address]-

 

                          Home Phone                [phone]

 

                          Preferred Language        English

 

                          Marital Status            

 

                          Nondenominational Affiliation     None

 

                          Race                      Other

 

                          Ethnic Group              Non-





Author







                          Author                    [facility]

 

                          Organization              [facility]

 

                          Address                   Unknown

 

                          Phone                     Unavailable







Encounter





MUNDO Foreman(KIMBERLYN) [id number] Date(s): 17 - 17

[facility] [address] [place] [address]-     [phone]


Discharge Diagnosis: Acute bilateral back pain

Discharge Disposition: Home or Self Care

Attending Physician: Farooq Greco MD





Vital Signs







  



                     Most recent to      1                   2



                                         oldest [Reference  



                                         Range]:  

 

  



                     Temperature Oral     98.6 DegF           97.6 DegF



                     [96.4-99.1 DegF]     (17 3:55 PM)     (17 12:20 PM)

 

  



                     Blood Pressure      111/76 mmHg         132/88 mmHg



                     [/60-90 mmHg]     (17 3:55 PM)     (17 12:20 PM)

 

  



                     Respiratory Rate     18 BRMIN            18 BRMIN



                     [14-20 BRMIN]       (17 3:55 PM)     (17 12:20 PM)

 

  



                     Peripheral Pulse     64 bpm              54 bpm



                     Rate [ bpm]     (17 3:55 PM)     *LOW*



                                         (17 12:20 PM)

 

  



                           Weight                    81.818 kg 



                                         (17 12:20 PM) 







Problem List







    



              Condition     Effective Dates     Status       Health Status     Informant

 

    



                           Chest                     Active  



                                         pain(Confirmed)    

 

    



                           Lyme                      Resolved  



                                         disease(Confirmed)    

 

    



                           Syncope(Confirmed)        Active  







Allergies, Adverse Reactions, Alerts







   



                 Substance       Reaction        Severity        Status

 

   



                           morphine                  Active

 

   



                           sulfa drugs               Active







Medications





Omnipaque 300 injectable solution

100 mL, Route: IVP, Dosing Weight 81.818, kg, ONCALL, GFR > 45 mL/min, STAT, 
Start date: 17 15:22:00 CST, Duration: 1 doses or times

Start Date: 17

Stop Date: 17

Status: Discontinued



Omnipaque 300 injectable solution

85 mL, Route: IVP, Drug Form: SOLN, Dosing Weight 81.818, kg, ONCALL, GFR > 45 
mL/min, STAT, Start date: 17 15:36:00 CST, Duration: 1 doses or times

Notes: (Same as:Omnipaque 300).WASTE: F/P - Black; E - Municipal Trash Bin

Start Date: 17

Stop Date: 17

Status: Completed



Omnipaque 350 injectable solution

125 mL, Route: IVP, Drug Form: SOLN, Dosing Weight 81.818, kg, ONCALL, For CTA e
xam with GFR > 45 mL/min, STAT, Start date: 17 14:21:00 CST, Duration: 1 
doses or times

Notes: (same as:Omnipaque 350).WASTE: F/P - Black; E - Municipal Trash Bin

Start Date: 17

Stop Date: 17

Status: Discontinued



Saline Flush 0.9%

10 mL, Route: IVP, Drug Form: INJ, Dosing Weight 81.818, kg, PRN, PRN Line Flush
, Start date: 17 12:56:00 CST, Duration: 30 day, Stop date: 17 13:55
:00 CDT

Notes: (Same as: BD Posiflush)

Start Date: 17

Stop Date: 17

Status: Discontinued



Results





ELECTROLYTES





 



                           Most recent to            1



                                         oldest [Reference 



                                         Range]: 

 

 



                           Sodium Lvl [135-145       140 mEq/L



                           mEq/L]                    (17 1:08 PM)

 

 



                           Potassium Lvl             4.3 mEq/L



                           [3.5-5.1 mEq/L]           (17 1:08 PM)

 

 



                           Chloride Lvl [      103 mEq/L



                           mEq/L]                    (17 1:08 PM)

 

 



                           CO2 [24-32 mEq/L]         31 mEq/L



                                         (17 1:08 PM)

 

 



                           AGAP [10.0-20.0           10.3 mEq/L



                           mEq/L]                    (17 1:08 PM)







CHEM PANEL





 



                           Most recent to            1



                                         oldest [Reference 



                                         Range]: 

 

 



                           Creatinine Lvl            0.96 mg/dL



                           [0.50-1.40 mg/dL]         (17 1:08 PM)

 

 



                           eGFR                      86 mL/min/1.73m2 1



                                         *NA*



                                         (17 1:08 PM)

 

 



                           BUN [7-22 mg/dL]          10 mg/dL



                                         (17 1:08 PM)

 

 



                           B/C Ratio [6-25]          10



                                         (17 1:08 PM)

 

 



                           Glucose Lvl [70-99        92 mg/dL



                           mg/dL]                    (17 1:08 PM)

 

 



                           Total Protein             7.3 g/dL



                           [6.4-8.4 g/dL]            (17 1:08 PM)

 

 



                           Albumin Lvl [3.5-5.0      4.2 g/dL



                           g/dL]                     (17 1:08 PM)

 

 



                           Globulin [2.7-4.2         3.1 g/dL



                           g/dL]                     (17 1:08 PM)

 

 



                           A/G Ratio [0.7-1.6]       1.4



                                         (17 1:08 PM)

 

 



                           Calcium Lvl               8.5 mg/dL



                           [8.5-10.5 mg/dL]          (17 1:08 PM)

 

 



                           ALT [0-65 unit/L]         55 unit/L



                                         (17 1:08 PM)

 

 



                           AST [0-37 unit/L]         32 unit/L



                                         (17 1:08 PM)

 

 



                           Alk Phos [          82 unit/L



                           unit/L]                   (17 1:08 PM)

 

 



                           Bili Total [0.2-1.3       1.4 mg/dL



                           mg/dL]                    *HI*



                                         (17 1:08 PM)

 

 



                           Lipase Lvl [        323 unit/L



                           unit/L]                   (17 1:08 PM)







1Result Comment: The eGFR is calculated using the CKD-EPI formula. In most 
young, healthy individuals the eGFR will be >90 mL/min/1.73m2. The eGFR declines
with age. An eGFR of 60-89 may be normal in some populations, particularly the 
elderly, for whom the CKD-EPI formula has not been extensively validated. Use of
the eGFR is not recommended in the following populations:



Individuals with unstable creatinine concentrations, including pregnant patients
and those with serious co-morbid conditions.



Patients with extremes in muscle mass or diet. 



The data above are obtained from the National Kid[employer] Disease Education Program (
NKDEP) which additionally recommends that when the eGFR is used in patients with
extremes of body mass index for purposes of drug dosing, the eGFR should be mul
tiplied by the estimated BMI.



CARDIAC ENZYMES





 



                           Most recent to            1



                                         oldest [Reference 



                                         Range]: 

 

 



                           Total CK [          73 unit/L



                           unit/L]                   (17 1:08 PM)

 

 



                           CK MB [0.5-3.6            <0.5 ng/mL



                           ng/mL]                    (17 1:08 PM)

 

 



                           CK MB Index               <0.7



                           [0.0-2.5]                 (17 1:08 PM)

 

 



                           Troponin-I                <0.02 ng/mL



                           [0.00-0.40 ng/mL]         (17 1:08 PM)

 

 



                           BNP [<=100 pg/mL]         29 pg/mL



                                         (17 1:08 PM)







HEMATOLOGY





 



                           Most recent to            1



                                         oldest [Reference 



                                         Range]: 

 

 



                           WBC [3.7-10.4 K/CMM]      8.3 K/CMM



                                         (17 1:08 PM)

 

 



                           RBC [4.70-6.10            5.47 M/CMM



                           M/CMM]                    (17 1:08 PM)

 

 



                           Hgb [14.0-18.0 g/dL]      16.1 g/dL



                                         (17 1:08 PM)

 

 



                           Hct [42.0-54.0 %]         48.3 %



                                         (17 1:08 PM)

 

 



                           MCV [80.0-94.0 fL]        88.3 fL



                                         (17 1:08 PM)

 

 



                           MCH [27.0-31.0 pg]        29.4 pg



                                         (17 1:08 PM)

 

 



                           MCHC [32.0-36.0           33.3 g/dL



                           g/dL]                     (17 1:08 PM)

 

 



                           RDW [11.5-14.5 %]         13.2 %



                                         (17 1:08 PM)

 

 



                           Platelet [133-450         218 K/CMM



                           K/CMM]                    (17 1:08 PM)

 

 



                           MPV [7.4-10.4 fL]         7.8 fL



                                         (17 1:08 PM)

 

 



                           Segs [45.0-75.0 %]        70.5 %



                                         (17 1:08 PM)

 

 



                           Lymphocytes               19.5 %



                           [20.0-40.0 %]             *LOW*



                                         (17 1:08 PM)

 

 



                           Monocytes [2.0-12.0       7.3 %



                           %]                        (17 1:08 PM)

 

 



                           Eosinophils [0.0-4.0      1.7 %



                           %]                        (17 1:08 PM)

 

 



                           Basophils [0.0-1.0        1.0 %



                           %]                        (17 1:08 PM)

 

 



                           Segs-Bands #              5.9 K/CMM



                           [1.5-8.1 K/CMM]           (17 1:08 PM)

 

 



                           Lymphocytes #             1.6 K/CMM



                           [1.0-5.5 K/CMM]           (17 1:08 PM)

 

 



                           Monocytes # [0.0-0.8      0.6 K/CMM



                           K/CMM]                    (17 1:08 PM)

 

 



                           Eosinophils #             0.1 K/CMM



                           [0.0-0.5 K/CMM]           (17 1:08 PM)

 

 



                           Basophils # [0.0-0.2      0.1 K/CMM



                           K/CMM]                    (17 1:08 PM)

 

 



                           PT [12.0-14.7             13.2 seconds



                           seconds]                  (17 1:08 PM)

 

 



                           INR [0.85-1.17]           0.98



                                         (17 1:08 PM)







Immunizations





No data available for this section



Procedures







   



                 Procedure       Date            Related Diagnosis     Body Site

 

   



                           Procedure1                  

 

   



                           Repair of meniscus2         







1Neck Surgery C5 C7 fused



2left knee



Social History







 



                           Social History Type       Response

 

 



                           Exercise                  1

 

 



                           Alcohol                   Never, Previous treatment: None.

 

 



                           Smoking Status            Never smoker; Exposure to Tobacco Smoke None; Cigarette Smoking

 Last 365



                                         Days No; Reg Smoking Cessation Counseling No







1none



Assessment and Plan





No data available for this section

## 2018-12-28 NOTE — XMS REPORT
Summary of Care: 1/22/15 - 1/22/15

                             Created on: 2017



EH NOBLES

External Reference #: [id number]

: 1960

Sex: Male



Demographics







                          Address                   [address] [place]  [address]-

 

                          Home Phone                [phone]

 

                          Preferred Language        English

 

                          Marital Status            

 

                          Mormonism Affiliation     None

 

                          Race                      White/

 

                          Ethnic Group              Non-





Author







                          Organization              Unknown

 

                          Address                   Unknown

 

                          Phone                     Unavailable







Encounter





MUNDO Foreman(KIMBERLYN) [id number] Date(s): 1/22/15 - 1/22/15

[address]

Discharge Diagnosis: Pneumonia

Discharge Disposition: Home

Physician Attending: Fabiana Posey MD





Reason for Visit





OTHER



Vital Signs







  



                     Most recent to      1                   2



                                         oldest [Reference  



                                         Range]:  

 

  



                           Height                    182.88 cm 



                                         (1/22/15 12:13 PM) 

 

  



                     Temperature Oral     97.8 DegF           97.8 DegF



                     [96.4-99.1 DegF]     (1/22/15 5:38 PM)     (1/22/15 12:13 PM)

 

  



                     Systolic Blood      119 mmHg            113 mmHg



                     Pressure [     (1/22/15 5:38 PM)     (1/22/15 12:13 PM)



                                         mmHg]  

 

  



                     Diastolic Blood     70 mmHg             72 mmHg



                     Pressure [60-90     (1/22/15 5:38 PM)     (1/22/15 12:13 PM)



                                         mmHg]  

 

  



                     Respiratory Rate     18 BRMIN            18 BRMIN



                     [14-20 BRMIN]       (1/22/15 5:38 PM)     (1/22/15 12:13 PM)

 

  



                     Peripheral Pulse     51 bpm              55 bpm



                     Rate [ bpm]     *LOW*               *LOW*



                           (1/22/15 5:38 PM)         (1/22/15 12:13 PM)

 

  



                           Weight                    81.818 kg 



                                         (1/22/15 12:13 PM) 

 

  



                           Body Mass Index           24.46 m2 



                                         (1/22/15 12:13 PM) 







Problem List







    



              Condition     Effective Dates     Status       Health Status     Informant

 

    



                           Chest                     Active  



                                         pain(Confirmed)    

 

    



                           Lyme                      Resolved  



                                         disease(Confirmed)    

 

    



                           Syncope(Confirmed)        Active  







Allergies, Adverse Reactions, Alerts







   



                 Substance       Reaction        Severity        Status

 

   



                           morphine                  Active

 

   



                           NKFA                      Active

 

   



                           sulfa drugs               Active







Medications





aspirin

324 mg, 4 tab, Route: PO, Drug form: CHEWTAB, ONCE, Dosing Weight 81.818, kg, Pr
iority: STAT, Start date: 01/22/15 15:05:00, Stop date: 01/22/15 15:05:00

Notes: Take with food.

Start Date: 1/22/15

Stop Date: 1/22/15

Status: Completed



Levaquin 750 mg oral tablet

750 mg=1 tab, PO, Q24H, # 5 tab, 0 Refill(s)

Start Date: 1/22/15

Stop Date: 1/27/15

Status: Ordered



Saline Flush 0.9%

10 mL, Route: IVP, Drug Form: INJ, Dosing Weight 81.818, kg, PRN, PRN Line Flush
, Start date: 01/22/15 15:05:00, Duration: 30 day, Stop date: 02/21/15 15:04:00

Notes: (Same as: BD Posiflush)

Start Date: 1/22/15

Stop Date: 1/22/15

Status: Discontinued



Results





ELECTROLYTES





 



                           Most recent to            1



                                         oldest [Reference 



                                         Range]: 

 

 



                           Sodium Lvl [135-145       142 mEq/L



                           mEq/L]                    (1/22/15 3:24 PM)

 

 



                           Potassium Lvl             3.8 mEq/L



                           [3.5-5.1 mEq/L]           (1/22/15 3:24 PM)

 

 



                           Chloride Lvl [      107 mEq/L



                           mEq/L]                    (1/22/15 3:24 PM)

 

 



                           CO2 [24-32 mEq/L]         26 mEq/L



                                         (1/22/15 3:24 PM)

 

 



                           AGAP [10.0-20.0           12.8 mEq/L



                           mEq/L]                    (1/22/15 3:24 PM)







CHEM PANEL





 



                           Most recent to            1



                                         oldest [Reference 



                                         Range]: 

 

 



                           Creatinine Lvl            0.9 mg/dL



                           [0.5-1.4 mg/dL]           (1/22/15 3:24 PM)

 

 



                           eGFR                      95 mL/min/1.73m2 1



                                         *NA*



                                         (1/22/15 3:24 PM)

 

 



                           BUN [7-22 mg/dL]          9 mg/dL



                                         (1/22/15 3:24 PM)

 

 



                           Glucose Lvl [70-99        126 mg/dL 2



                           mg/dL]                    *HI*



                                         (1/22/15 3:24 PM)

 

 



                           Calcium Lvl               8.8 mg/dL



                           [8.5-10.5 mg/dL]          (1/22/15 3:24 PM)







1Result Comment: The eGFR is calculated using the CKD-EPI formula. In most 
young, healthy individuals the eGFR will be >90 mL/min/1.73m2. The eGFR declines
with age. An eGFR of 60-89 may be normal in some populations, particularly the 
elderly, for whom the CKD-EPI formula has not been extensively validated. Use of
the eGFR is not recommended in the following populations:



Individuals with unstable creatinine concentrations, including pregnant patients
and those with serious co-morbid conditions.



Patients with extremes in muscle mass or diet. 



The data above are obtained from the National Kidney Disease Education Program (
NKDEP) which additionally recommends that when the eGFR is used in patients with
extremes of body mass index for purposes of drug dosing, the eGFR should be mul
tiplied by the estimated BMI.



2Interpretive Data: Adult reference range values reflect the clinical guidelines

of the American Diabetes Association.



CARDIAC ENZYMES





 



                           Most recent to            1



                                         oldest [Reference 



                                         Range]: 

 

 



                           Total CK [          108 unit/L



                           unit/L]                   (1/22/15 3:24 PM)

 

 



                           CK MB [0.5-3.6            <0.5 ng/mL



                           ng/mL]                    (1/22/15 3:24 PM)

 

 



                           CK MB Index               <0.5



                           [0.0-2.5]                 (1/22/15 3:24 PM)

 

 



                           Troponin-I                <0.02 ng/mL



                           [0.00-0.40 ng/mL]         (1/22/15 3:24 PM)

 

 



                           BNP [<=100 pg/mL]         35 pg/mL 3



                                         (1/22/15 3:24 PM)







3Interpretive Data: Elevated results are in line with increasing severity of

congestive heart failure. Minor elevations between 100 and 300

may be seen with Myocardial Ischemia, Sodium retaining drugs,

and compensated/treated heart failure.



HEMATOLOGY





 



                           Most recent to            1



                                         oldest [Reference 



                                         Range]: 

 

 



                           WBC [3.7-10.4 K/CMM]      6.9 K/CMM



                                         (1/22/15 3:24 PM)

 

 



                           RBC [4.70-6.10            5.04 M/CMM



                           M/CMM]                    (1/22/15 3:24 PM)

 

 



                           Hgb [14.0-18.0 g/dL]      15.2 g/dL



                                         (1/22/15 3:24 PM)

 

 



                           Hct [42.0-54.0 %]         45.3 %



                                         (1/22/15 3:24 PM)

 

 



                           MCV [80.0-94.0 fL]        89.8 fL



                                         (1/22/15 3:24 PM)

 

 



                           MCH [27.0-31.0 pg]        30.2 pg



                                         (1/22/15 3:24 PM)

 

 



                           MCHC [32.0-36.0           33.6 g/dL



                           g/dL]                     (1/22/15 3:24 PM)

 

 



                           RDW [11.5-14.5 %]         12.8 %



                                         (1/22/15 3:24 PM)

 

 



                           Platelet [133-450         209 K/CMM



                           K/CMM]                    (1/22/15 3:24 PM)

 

 



                           MPV [7.4-10.4 fL]         7.9 fL



                                         (1/22/15 3:24 PM)

 

 



                           Segs [45.0-75.0 %]        60.9 %



                                         (1/22/15 3:24 PM)

 

 



                           Lymphocytes               28.8 %



                           [20.0-40.0 %]             (1/22/15 3:24 PM)

 

 



                           Monocytes [2.0-12.0       6.7 %



                           %]                        (1/22/15 3:24 PM)

 

 



                           Eosinophils [0.0-4.0      2.3 %



                           %]                        (1/22/15 3:24 PM)

 

 



                           Basophils [0.0-1.0        1.3 %



                           %]                        *HI*



                                         (1/22/15 3:24 PM)

 

 



                           Segs-Bands #              4.2 K/CMM



                           [1.5-8.1 K/CMM]           (1/22/15 3:24 PM)

 

 



                           Lymphocytes #             2.0 K/CMM



                           [1.0-5.5 K/CMM]           (1/22/15 3:24 PM)

 

 



                           Monocytes # [0.0-0.8      0.5 K/CMM



                           K/CMM]                    (1/22/15 3:24 PM)

 

 



                           Eosinophils #             0.2 K/CMM



                           [0.0-0.5 K/CMM]           (1/22/15 3:24 PM)

 

 



                           Basophils # [0.0-0.2      0.1 K/CMM



                           K/CMM]                    (1/22/15 3:24 PM)

 

 



                           PT [12.0-14.7             12.8 seconds



                           seconds]                  (1/22/15 3:24 PM)

 

 



                           INR [0.85-1.17]           0.96 4



                                         (1/22/15 3:24 PM)

 

 



                           PTT [22.9-35.8            31.1 seconds 5



                           seconds]                  (1/22/15 3:24 PM)







4Interpretive Data: RECOMMENDED RANGES FOR PROTIME INR:

   2.0-3.0 for most medical and surgical thromboembolic states.

   2.5-3.5 for artificial heart valves and recurrent embolism.



INR SHOULD BE USED ONLY FOR PATIENTS ON STABLE ANTICOAGULANT THERAPY.



5Interpretive Data: Heparin Therapeutic Range:  57 - 92 Seconds



Medications Administered During Your Visit





No data available for this section



Immunizations





No data available for this section



Social History







 



                           Social History Type       Response

 

 



                           Exercise                  1

 

 



                           Alcohol                   Use: Never, Previous treatment: None

 

 



                           Smoking Status            Never smoker, Exposure to Tobacco Smoke None, Cigarette Smoking

 Last 365



                                         Days No, Reg Smoking Cessation Counseling No







1none